# Patient Record
Sex: FEMALE | Race: WHITE
[De-identification: names, ages, dates, MRNs, and addresses within clinical notes are randomized per-mention and may not be internally consistent; named-entity substitution may affect disease eponyms.]

---

## 2017-09-20 ENCOUNTER — HOSPITAL ENCOUNTER (OUTPATIENT)
Dept: HOSPITAL 47 - RADMAMWWP | Age: 57
Discharge: HOME | End: 2017-09-20
Payer: MEDICAID

## 2017-09-20 DIAGNOSIS — Z12.31: Primary | ICD-10-CM

## 2017-09-20 PROCEDURE — 77063 BREAST TOMOSYNTHESIS BI: CPT

## 2017-09-21 NOTE — MM
Reason for exam: screening  (asymptomatic).

Last mammogram was performed 1 year ago.



History:

Patient is postmenopausal and is nulliparous.

Benign cyst aspiration of the right breast, August 2005.  2 cyst aspirations of 

the left breast.



Physical Findings:

A clinical breast exam by your physician is recommended on an annual basis and 

results should be correlated with mammographic findings.



MG 3D Screening Mammo W/Cad

Bilateral CC and MLO view(s) were taken.

Prior study comparison: September 19, 2016, bilateral MG 3d screening mammo w/cad.

September 15, 2015, bilateral MG screening mammo w CAD.

The breast tissue is heterogeneously dense. This may lower the sensitivity of 

mammography.

Finding: There is a 8 mm mass located 4.4 cm from the nipple in the upper inner 

quadrant, middle position of the right breast for which an ultrasound will be 

performed. No suspicious abnormality on the left breast.





ASSESSMENT: Incomplete: need additional imaging evaluation, BI-RAD 0



RECOMMENDATION:

Ultrasound of the right breast.



[facility] will attempt to contact patient  to return for ultrasound.

## 2017-09-22 ENCOUNTER — HOSPITAL ENCOUNTER (OUTPATIENT)
Dept: HOSPITAL 47 - RADUSWWP | Age: 57
Discharge: HOME | End: 2017-09-22
Payer: MEDICAID

## 2017-09-22 DIAGNOSIS — R92.8: Primary | ICD-10-CM

## 2017-09-22 NOTE — USB
Reason for exam: additional evaluation requested from abnormal screening.



History:

Patient is postmenopausal and is nulliparous.

Benign cyst aspiration of the right breast, August 2005.  2 cyst aspirations of 

the left breast.



Physical Findings:

Nurse did not find any significant physical abnormalities on exam.



US Breast Workup Limited RT

Right breast ultrasound demonstrates a 4 x 4 x 4mm oval, solid, hypoechoic lesion 

at 2 o'clock.



These results were verbally communicated with the patient and result sheet given 

to the patient on 9/22/17.





ASSESSMENT: Suspicious, BI-RAD 4



RECOMMENDATION:

Surgical consultation and ultrasound core biopsy of the right breast.



Called Dr. Ying with mammographic findings and has scheduled an appointment 

for the patient for 10/5/17 at 8:30 with Dr. Alonso.

Ultrasound core biopsy scheduled for 9/29/17 at 8:00.





PRELIMINARY REPORT CALLED AND FAXED TO DR. ALONSO ON 9/22/17.

## 2017-09-29 ENCOUNTER — HOSPITAL ENCOUNTER (OUTPATIENT)
Dept: HOSPITAL 47 - RADUSWWP | Age: 57
End: 2017-09-29
Payer: MEDICAID

## 2017-09-29 VITALS — SYSTOLIC BLOOD PRESSURE: 152 MMHG | DIASTOLIC BLOOD PRESSURE: 83 MMHG | HEART RATE: 79 BPM

## 2017-09-29 VITALS — RESPIRATION RATE: 16 BRPM | TEMPERATURE: 97.9 F

## 2017-09-29 VITALS — BODY MASS INDEX: 26.9 KG/M2

## 2017-09-29 DIAGNOSIS — N60.31: Primary | ICD-10-CM

## 2017-09-29 DIAGNOSIS — R92.8: ICD-10-CM

## 2017-09-29 PROCEDURE — 88305 TISSUE EXAM BY PATHOLOGIST: CPT

## 2017-09-29 PROCEDURE — 19083 BX BREAST 1ST LESION US IMAG: CPT

## 2017-09-29 NOTE — MM
Reason for exam: additional evaluation requested from abnormal screening.

Last mammogram was performed less than 1 month ago.



History:

Patient is postmenopausal and is nulliparous.

Benign cyst aspiration of the right breast, August 2005.  2 cyst aspirations of 

the left breast.



MG Diagnostic Mammo RT Wo CAD

CC and ML view(s) were taken of the right breast.

Prior study comparison: September 20, 2017, bilateral MG 3d screening mammo w/cad.

September 19, 2016, bilateral MG 3d screening mammo w/cad.



ASSESSMENT: Post procedure mammogram for marker placement



RECOMMENDATION:

Ultrasound of the right breast in 6 months.

PENDING PATHOLOGY RESULTS.

## 2017-09-29 NOTE — USB
EXAMINATION TYPE: US biopsy breast VAD RT

 

DATE OF EXAM: 9/29/2017

 

CLINICAL HISTORY: Abnormal Mammogram R92.8.

 

TECHNIQUE: Ultrasound guided core biopsy of right breast.  

 

COMPARISON: Targeted right breast ultrasound dated 9/22/2017 and mammogram 
dated 9/20/2017

 

FINDINGS: The procedure of ultrasound guided core biopsy was explained to the 
patient.  Benefits, alternatives, and risks were discussed.  An informed 
consent was then obtained.  

 

The patient was placed in supine positioning for  imaging and for the 
procedure. The overlying skin was prepped and draped in usual sterile fashion. 
8 cc of 1% lidocaine was used as anesthetic into the skin and subcutaneous 
tissue up to shadowing 4 mm solid hypoechoic mass at the 2:00 position in the 
right breast.

 

Under ultrasound guidance, a 12-gauge vacuum assisted biopsy gun device was 
used to obtain 6 core samples.  Following this, a biopsy clip was near, 
although the lesion was obscured after lidocaine displacement and postbiopsy 
change.  The clip appears appropriately placed in the postprocedure mammogram 
CC view and possibly 8-9 mm cranial on the MLO view. The patient tolerated the 
procedure well without any immediate complication.  The patient was kept in the 
radiology department for short stay after the procedure and then discharged 
home in stable condition.  

 

IMPRESSION: Successful, uncomplicated ultrasound guided core biopsy of a 4 mm 
solid hypoechoic mass at the 2:00 position within the right breast, full 
pathology results to follow. Note the mass became obscured due to postbiopsy 
change and the clip may be located 8 to 9 mm cranial to the mass. If pathology 
result is positive repeat ultrasound to evaluate clip placement or 3D right 
diagnostic mammogram to evaluate clip relationship to the mass is recommended 
before needle localization.

 

Pathology Results: Benign



BREAST, RIGHT, SITE A 2:00, CORE BIOPSY: BENIGN BREAST WITH FIBROCYSTIC CHANGES 
INCLUDING FIBROSIS AND AN ISOLATED DUCT WITH USUAL TYPE DUCTAL HYPERPLASIA. 
FRAGMENTS OF AMORPHOUS MATERIAL WITH DEGENERATED CELLULAR MATERIAL AND BLOOD. 
SEE NOTE. 



Recommendation

Repeat procedure.



(degree of architectural distortion and shadowing is concerning)
MTDD

## 2017-11-20 ENCOUNTER — HOSPITAL ENCOUNTER (OUTPATIENT)
Dept: HOSPITAL 47 - OR | Age: 57
Discharge: HOME | End: 2017-11-20
Payer: MEDICAID

## 2017-11-20 VITALS — HEART RATE: 73 BPM | DIASTOLIC BLOOD PRESSURE: 79 MMHG | SYSTOLIC BLOOD PRESSURE: 141 MMHG

## 2017-11-20 VITALS — RESPIRATION RATE: 18 BRPM

## 2017-11-20 VITALS — BODY MASS INDEX: 26.9 KG/M2

## 2017-11-20 VITALS — TEMPERATURE: 98.1 F

## 2017-11-20 DIAGNOSIS — D05.01: Primary | ICD-10-CM

## 2017-11-20 DIAGNOSIS — N60.31: ICD-10-CM

## 2017-11-20 DIAGNOSIS — N60.01: ICD-10-CM

## 2017-11-20 DIAGNOSIS — I10: ICD-10-CM

## 2017-11-20 DIAGNOSIS — M81.0: ICD-10-CM

## 2017-11-20 DIAGNOSIS — N60.21: ICD-10-CM

## 2017-11-20 DIAGNOSIS — N60.81: ICD-10-CM

## 2017-11-20 PROCEDURE — 19285 PERQ DEV BREAST 1ST US IMAG: CPT

## 2017-11-20 PROCEDURE — 19125 EXCISION BREAST LESION: CPT

## 2017-11-20 PROCEDURE — 88307 TISSUE EXAM BY PATHOLOGIST: CPT

## 2017-11-20 PROCEDURE — 76098 X-RAY EXAM SURGICAL SPECIMEN: CPT

## 2017-11-20 PROCEDURE — 88341 IMHCHEM/IMCYTCHM EA ADD ANTB: CPT

## 2017-11-20 PROCEDURE — 76642 ULTRASOUND BREAST LIMITED: CPT

## 2017-11-20 PROCEDURE — 88342 IMHCHEM/IMCYTCHM 1ST ANTB: CPT

## 2017-11-20 NOTE — P.OP
Date of Procedure: 11/20/17


Procedure(s) Performed: 


PREOPERATIVE DIAGNOSIS: Abnormal right mammogram and ultrasound


POSTOPERATIVE DIAGNOSIS: Same


PROCEDURE: Right Breast wire localization biopsy


SURGEON: Yareli


EBL: Minimal


ANESTHESIA: Sedation plus local


COMPLICATIONS: None


OPERATIVE PROCEDURE: Patient was placed on the operating room table in the 

supine position.  The patient's breast was prepped and draped in usual sterile 

fashion.  A curvilinear incision was made adjacent to the wire entrance site 

along the areolar border.  I followed the wire down into the breast tissue.  

The breast tissue around the tip of the wire was fully excised using 

electrocautery.  The specimen was sent for specimen radiogram.  The clip was 

present within the specimen.  The subcutaneous tissues were inspected.  No 

bleeding was seen.  The subcutaneous tissues were closed using 3-0 Vicryl 

sutures.  The skin was closed using a running 4-0 Monocryl stitch.  Steri-

Strips and sterile dressings were applied.


DISPOSITION: Stable to recovery room

## 2017-11-20 NOTE — USB
EXAMINATION TYPE: 

 

US breast localization RT, 

Post wire placement 3D diag mammo wo cad RT, 

MG surgical specimen RT

 

DATE OF EXAM: 11/20/2017

 

CLINICAL HISTORY: 57-year-old female status post ultrasound-guided 2:00 right 
breast biopsy. Benign results but with suspicious imaging findings. Canceled 
second attempt ultrasound-guided biopsy due to difficulty visualizing lesion. 
Referred for wire localization and excision.

 

TECHNIQUE: Needle localization with wire placement and surgical excision of 
area of concern in the right breast 2:00 position

 

COMPARISON: 11/20/2017, 9/29/2017, 9/20/2017, 9/19/2016

 

FINDINGS: 

The procedure of needle localization with wire placement and than surgical 
excision was explained to the patient.  Benefits, alternatives, and risks were 
discussed.  An informed consent was then obtained.  

 

Ultrasound was used to localize the 2:00 previously biopsied area which appears 
as a vague area of irregular hypoechogenicity.

 

The shortest pathway for procedure was chosen.  Shortest pathway was a medial 
approach. The overlying skin was prepped and draped in usual sterile fashion.  
Lidocaine was used as anesthetic into the skin and subcutaneous tissue up to 
the level of area of concern.  

 

A 7 cm Kopans needle was used.  It was placed via a medial approach under 
ultrasound guidance.  Once the needle was through and beyond the lesion, the 
needle was withdrawn leaving the wire in place. From the tip to the mid of the 
lesion, there is a distance of 1.8 cm. From the tip of the needle to the skin 
surface, there is a distance of approximately 5 cm.

 

The wire was fixed to patient's skin.  Images were marked for surgeon.  The 
patient tolerated the procedure well without any immediate complication.  The 
patient was kept in the radiology department for short stay after the procedure 
and then taken to surgery for surgical excision.    

 

Post localization mammogram shows the thick segment of the needle along the 
biopsy clip.

 

The microclip and wire are identified in specimen mammogram.  The patient was 
kept in hospital for short stay after the procedure and then discharged home in 
stable condition.

 

 

IMPRESSION: 

Successful, uncomplicated ultrasound-guided needle localization with wire 
placement and surgical excision of recently biopsied (benign results) but still 
suspicious site in the 2:00 left breast. Excision being performed due to 
concern for discordant results. Full pathology results to follow.  

 

Pathology Results: Malignant



BREAST, EXCISIONAL BIOPSY: LOBULAR CARCINOMA IN SITU (LCIS).  FIBROCYSTIC 
CHANGE (STROMAL FIBROSIS, CYST FORMATION, APOCRINE METAPLASIA, ADENOSIS, 
FIBROADENOMATOUS HYPERPLASIA AND DUCT HYPERPLASIA).  BIOPSY SITE CHANGE AND 
SCAR. 



Recommendation

Surgical consult of the right breast.
MTDD

## 2017-11-20 NOTE — USB
Reason for exam: additional evaluation requested from prior study.



History:

Patient is postmenopausal and is nulliparous.

US discontinued breast bx RT of the right breast, November 6, 2017.  Benign US 

biopsy breast VAD RT of the right breast, September 29, 2017.  Benign cyst 

aspiration of the right breast, August 2005.  2 cyst aspirations of the left 

breast.



US Breast Limited RT

Right breast ultrasound demonstrates a 9 x 4 x 7mm irregular, hypoechoic, vague 

lesion at 2 o'clock at the site of previous biopsy. This can be targeted for 

ultrasound guided wire loc.



These results were verbally communicated with the patient and result sheet given 

to the patient on 11/20/17.





ASSESSMENT: Suspicious, BI-RAD 4



RECOMMENDATION:

Localization and excision of the right breast.

## 2017-11-20 NOTE — P.GSHP
History of Present Illness


H&P Date: 11/20/17


Chief Complaint: Abnormal right breast ultrasound





Patient underwent a recent mammogram showing an 8 mm lesion in the right breast 

upper inner quadrant.  She then had an ultrasound showing a 4 mm solid lesion 

at that location.  Ultrasound core biopsy showed benign findings but there is 

concern of discordance.  For that reason radiology has advised a wire 

localization biopsy for confirmation of benign nature of this lesion.  Patient 

has no family history of breast cancer.  Prior to this mammogram she had no 

complaints.  No prior biopsies.





Past Medical History


Past Medical History: Hypertension


Additional Past Medical History / Comment(s): osteoporosis.  HAS MASS TO RT 

BREAST


History of Any Multi-Drug Resistant Organisms: None Reported


Past Surgical History: Breast Surgery


Additional Past Surgical History / Comment(s): ovarian cyst removal, 

Oophorectomy left,  Breast biopsy x 3 benign.  COLONOSCOPY


Past Anesthesia/Blood Transfusion Reactions: No Reported Reaction


Smoking Status: Never smoker





- Past Family History


  ** Mother


Family Medical History: No Reported History





Medications and Allergies


 Home Medications











 Medication  Instructions  Recorded  Confirmed  Type


 


Alendronate Sodium [Fosamax] 1 each PO BERUMEN 09/25/17 11/15/17 History


 


Calcium Carbonate/Vitamin D3 1 each PO DAILY 09/25/17 11/15/17 History





[Caltrate 600 Plus D3 Tablet]    


 


Multivit-Min/FA/Lycopen/Lutein 1 each PO DAILY 09/25/17 11/15/17 History





[Centrum Silver Tablet]    


 


Ramipril [Altace] 5 mg PO DAILY 09/25/17 11/15/17 History











 Allergies











Allergy/AdvReac Type Severity Reaction Status Date / Time


 


No Known Allergies Allergy   Verified 11/15/17 11:46














Surgical - Exam








Physical exam:


General: Well-developed, well-nourished


HEENT: Normocephalic, sclerae nonicteric


Right breast with recent scar and some bruising, mild tenderness, no adenopathy 

left breast without masses or adenopathy


Abdomen: Nontender, nondistended


Extremities: No edema


Neuro: Alert and oriented





Assessment and Plan


(1) Abnormal ultrasound of breast


Narrative/Plan: 


Will proceed with wire localization biopsy today for confirmation sake.  Risks 

of bleeding, infection, scarring, numbness, pain, and potential need for 

additional surgery were discussed.  She understands and wishes to proceed.


Status: Acute   Code(s): R92.8 - OTH ABN AND INCONCLUSIVE FINDINGS ON DX 

IMAGING OF BREAST   SNOMED Code(s): [id number]

## 2017-12-30 ENCOUNTER — HOSPITAL ENCOUNTER (OUTPATIENT)
Dept: HOSPITAL 47 - LABWHC1 | Age: 57
Discharge: HOME | End: 2017-12-30
Payer: MEDICAID

## 2017-12-30 DIAGNOSIS — D05.01: ICD-10-CM

## 2017-12-30 DIAGNOSIS — R00.1: Primary | ICD-10-CM

## 2017-12-30 DIAGNOSIS — M81.0: ICD-10-CM

## 2017-12-30 DIAGNOSIS — I10: ICD-10-CM

## 2017-12-30 LAB
ALBUMIN SERPL-MCNC: 4.8 G/DL (ref 3.5–5)
ALP SERPL-CCNC: 47 U/L (ref 38–126)
ALT SERPL-CCNC: 39 U/L (ref 9–52)
ANION GAP SERPL CALC-SCNC: 10 MMOL/L
AST SERPL-CCNC: 20 U/L (ref 14–36)
BASOPHILS # BLD AUTO: 0.1 K/UL (ref 0–0.2)
BASOPHILS NFR BLD AUTO: 1 %
BUN SERPL-SCNC: 17 MG/DL (ref 7–17)
CALCIUM SPEC-MCNC: 10.6 MG/DL (ref 8.4–10.2)
CHLORIDE SERPL-SCNC: 100 MMOL/L (ref 98–107)
CHOLEST SERPL-MCNC: 195 MG/DL (ref ?–200)
CK SERPL-CCNC: 34 U/L (ref 30–135)
CO2 SERPL-SCNC: 28 MMOL/L (ref 22–30)
EOSINOPHIL # BLD AUTO: 0.1 K/UL (ref 0–0.7)
EOSINOPHIL NFR BLD AUTO: 1 %
ERYTHROCYTE [DISTWIDTH] IN BLOOD BY AUTOMATED COUNT: 5.84 M/UL (ref 3.8–5.4)
ERYTHROCYTE [DISTWIDTH] IN BLOOD: 14.1 % (ref 11.5–15.5)
GLUCOSE SERPL-MCNC: 82 MG/DL (ref 74–99)
HCT VFR BLD AUTO: 51.5 % (ref 34–46)
HDLC SERPL-MCNC: 58 MG/DL (ref 40–60)
HGB BLD-MCNC: 16 GM/DL (ref 11.4–16)
KETONES UR QL STRIP.AUTO: (no result)
LDLC SERPL CALC-MCNC: 123 MG/DL (ref 0–99)
LYMPHOCYTES # SPEC AUTO: 1.8 K/UL (ref 1–4.8)
LYMPHOCYTES NFR SPEC AUTO: 25 %
MAGNESIUM SPEC-SCNC: 2 MG/DL (ref 1.6–2.3)
MCH RBC QN AUTO: 27.4 PG (ref 25–35)
MCHC RBC AUTO-ENTMCNC: 31.1 G/DL (ref 31–37)
MCV RBC AUTO: 88.3 FL (ref 80–100)
MONOCYTES # BLD AUTO: 0.4 K/UL (ref 0–1)
MONOCYTES NFR BLD AUTO: 6 %
NEUTROPHILS # BLD AUTO: 4.6 K/UL (ref 1.3–7.7)
NEUTROPHILS NFR BLD AUTO: 65 %
PH UR: 6 [PH] (ref 5–8)
PLATELET # BLD AUTO: 258 K/UL (ref 150–450)
POTASSIUM SERPL-SCNC: 4.6 MMOL/L (ref 3.5–5.1)
PROT SERPL-MCNC: 7.4 G/DL (ref 6.3–8.2)
RBC UR QL: <1 /HPF (ref 0–5)
SODIUM SERPL-SCNC: 138 MMOL/L (ref 137–145)
SP GR UR: 1.01 (ref 1–1.03)
SQUAMOUS UR QL AUTO: <1 /HPF (ref 0–4)
T4 FREE SERPL-MCNC: 1.23 NG/DL (ref 0.78–2.19)
TRIGL SERPL-MCNC: 68 MG/DL (ref ?–150)
URATE SERPL-MCNC: 4.9 MG/DL (ref 3.7–7.4)
UROBILINOGEN UR QL STRIP: <2 MG/DL (ref ?–2)
WBC # BLD AUTO: 7 K/UL (ref 3.8–10.6)
WBC #/AREA URNS HPF: 4 /HPF (ref 0–5)

## 2017-12-30 PROCEDURE — 82306 VITAMIN D 25 HYDROXY: CPT

## 2017-12-30 PROCEDURE — 86300 IMMUNOASSAY TUMOR CA 15-3: CPT

## 2017-12-30 PROCEDURE — 82550 ASSAY OF CK (CPK): CPT

## 2017-12-30 PROCEDURE — 80061 LIPID PANEL: CPT

## 2017-12-30 PROCEDURE — 85025 COMPLETE CBC W/AUTO DIFF WBC: CPT

## 2017-12-30 PROCEDURE — 84439 ASSAY OF FREE THYROXINE: CPT

## 2017-12-30 PROCEDURE — 83735 ASSAY OF MAGNESIUM: CPT

## 2017-12-30 PROCEDURE — 36415 COLL VENOUS BLD VENIPUNCTURE: CPT

## 2017-12-30 PROCEDURE — 93005 ELECTROCARDIOGRAM TRACING: CPT

## 2017-12-30 PROCEDURE — 80053 COMPREHEN METABOLIC PANEL: CPT

## 2017-12-30 PROCEDURE — 81001 URINALYSIS AUTO W/SCOPE: CPT

## 2017-12-30 PROCEDURE — 84443 ASSAY THYROID STIM HORMONE: CPT

## 2017-12-30 PROCEDURE — 84550 ASSAY OF BLOOD/URIC ACID: CPT

## 2017-12-30 PROCEDURE — 82378 CARCINOEMBRYONIC ANTIGEN: CPT

## 2018-01-18 ENCOUNTER — HOSPITAL ENCOUNTER (OUTPATIENT)
Dept: HOSPITAL 47 - RADBDWWP | Age: 58
Discharge: HOME | End: 2018-01-18
Payer: MEDICAID

## 2018-01-18 DIAGNOSIS — M85.80: Primary | ICD-10-CM

## 2018-01-18 DIAGNOSIS — M81.0: ICD-10-CM

## 2018-01-18 PROCEDURE — 77080 DXA BONE DENSITY AXIAL: CPT

## 2018-01-19 NOTE — BD
EXAMINATION TYPE: MG DEXA axial skeleton.  

 

DATE OF EXAM: 1/18/2018

 

COMPARISON: NONE

 

CLINICAL HISTORY: 57-year-old female age-related osteoporosis, postmenopausal screening

 

Height:  4 FT 11 1/2 IN

Weight:  136

 

FRAX RISK QUESTIONS:

Alcohol (3 or more units per day):  NO

Family History (Parent hip fracture):  NO

Glucocorticoids (More than 3mos):  NO

           (Ex: prednisone, prednisolone, methylprednisolone, dexamethasone, and hydrocortisone).    
     

History of Fracture in Adulthood: YES

Secondary Osteoporosis:

  1.  Type 1 Diabetes: NO

  2.  Hyperthyroidism: NO

  3.  Menopause before 45: NO

  4.  Malnutrition: NO

  5.  Chronic liver disease: NO

Rheumatoid Arthritis: NO

Current Tobacco Use: NO

 

RISK FACTORS 

HISTORY OF: 

History of Wrist Fracture: RT 

When: AGE 36

Family History of Osteoporosis: YES

Active: YES

Postmenopausal woman: AGE 52

 

MEDICATIONS: 

Prednisone or other steroids:

How Long:

Osteoporosis Medications: YES

Which medication:  FOSAMZX

How Long: OVER FIVE YEARS

Additional Medications: TAMOXIFEN, FOSAMAX, VITAMINS

 

 

Additional History:

 

 

EXAM MEASUREMENTS: 

Bone mineral densitometry was performed using the [employer] System.

Bone mineral density as measured about the Lumbar spine is:  

----- L1-L4(G/cm2): 0.951

T Score Values are as follows:

----- L2: -2.5

----- L3: -1.6

----- L4: -1.4

----- L1-L4: -1.9

Bone mineral density has: INCREASED  3.8 % since study of: 2015

 

Bone mineral density about the R hip (g/cm2): 0.730

Bone mineral density about the L hip (g/cm2): 0.722

T Score values are as follows:

-----R Neck: -2.2

-----L Neck: -2.3

-----R Total: -1.4

-----L Total: -1.5

Bone mineral density has: INCREASED 2.0 % since study of: 2015

 

 

IMPRESSION:

Osteopenia (T Score between -2.5 and -1 as noted by T score values in the lumbar spine and both hips)
.

 

Note that measurements are approaching osteoporosis in the upper lumbar spine.

 

There is slightly increased risk of fracture and the patient may be considered for treatment. Re-Scre
en 2-5 years.

 

 

 

 

 

NOTE:  T-SCORE=SD OF THE YOUNG ADULT MEAN.

## 2018-04-02 ENCOUNTER — HOSPITAL ENCOUNTER (OUTPATIENT)
Dept: HOSPITAL 47 - RADMAMWWP | Age: 58
Discharge: HOME | End: 2018-04-02
Payer: MEDICAID

## 2018-04-02 DIAGNOSIS — R92.8: Primary | ICD-10-CM

## 2018-04-02 PROCEDURE — 77065 DX MAMMO INCL CAD UNI: CPT

## 2018-04-02 NOTE — MM
Reason for exam: follow-up at short interval from prior study.

Last mammogram was performed 4 months ago.



History:

Patient is postmenopausal, has history of breast cancer at age 57, and is 

nulliparous.

Malignant US breast localization RT of the right breast, November 20, 2017.  US 

discontinued breast bx RT of the right breast, November 6, 2017.  Benign US biopsy

breast VAD RT of the right breast, September 29, 2017.  Benign cyst aspiration of

the right breast, August 2005.  2 cyst aspirations of the left breast.



Physical Findings:

Nurse did not find any significant physical abnormalities on exam.



MG 3D Diag Mammo W/Cad RT

CC and MLO view(s) were taken of the right breast.

Prior study comparison: November 20, 2017, right breast MG 3d diag mammo wo cad 

RT.  September 29, 2017, right breast MG diagnostic mammo RT wo CAD.  September 19, 2016, bilateral MG 3d screening mammo w/cad.  September 15, 2015, bilateral MG

screening mammo w CAD.

The breast tissue is heterogeneously dense. This may lower the sensitivity of 

mammography.  2 o'clock architectural distortion at a middle depth suggests 

lumpectomy scar. 6 month follow up recommended to assess for evolving post 

surgical changes. Excision proven LCIS.



These results were verbally communicated with the patient and result sheet given 

to the patient on 4/2/18.





ASSESSMENT: Probably benign, BI-RAD 3



RECOMMENDATION:

Follow-up diagnostic mammogram of both breasts in 6 months.

## 2018-10-03 ENCOUNTER — HOSPITAL ENCOUNTER (OUTPATIENT)
Dept: HOSPITAL 47 - RADMAMWWP | Age: 58
Discharge: HOME | End: 2018-10-03
Attending: SURGERY
Payer: MEDICAID

## 2018-10-03 DIAGNOSIS — R92.8: Primary | ICD-10-CM

## 2018-10-03 PROCEDURE — 77066 DX MAMMO INCL CAD BI: CPT

## 2018-10-03 PROCEDURE — 77062 BREAST TOMOSYNTHESIS BI: CPT

## 2018-10-03 NOTE — MM
Reason for exam: follow-up at short interval from prior study.

Last mammogram was performed 6 months ago.



History:

Patient is postmenopausal, has history of breast cancer at age 57, and is 

nulliparous.

Malignant US breast localization RT of the right breast, November 20, 2017.  US 

discontinued breast bx RT of the right breast, November 6, 2017.  Benign US biopsy

breast VAD RT of the right breast, September 29, 2017.  Benign cyst aspiration of

the right breast, August 2005.  2 cyst aspirations of the left breast.



Physical Findings:

Nurse did not find any significant physical abnormalities on exam.



MG 3D Diag Mammo W/Cad GINETTE

Bilateral CC and MLO view(s) were taken.

Prior study comparison: April 2, 2018, right breast MG 3d diag mammo w/cad RT.  

November 20, 2017, right breast MG 3d diag mammo wo cad RT.

The breast tissue is heterogeneously dense. This may lower the sensitivity of 

mammography.  Redemonstrated large area of medial distortion right breast, stable 

for 6 months, likely post lumpectomy scar at site of LCIS. Additional 6 month 

follow up recommended.



These results were verbally communicated with the patient and result sheet given 

to the patient on 10/3/18.





ASSESSMENT: Probably benign, BI-RAD 3



RECOMMENDATION:

Follow-up diagnostic mammogram of the right breast in 6 months.

## 2019-02-25 ENCOUNTER — HOSPITAL ENCOUNTER (OUTPATIENT)
Dept: HOSPITAL 47 - LABWHC1 | Age: 59
Discharge: HOME | End: 2019-02-25
Attending: INTERNAL MEDICINE
Payer: MEDICAID

## 2019-02-25 DIAGNOSIS — R00.1: Primary | ICD-10-CM

## 2019-02-25 PROCEDURE — 36415 COLL VENOUS BLD VENIPUNCTURE: CPT

## 2019-02-25 PROCEDURE — 93005 ELECTROCARDIOGRAM TRACING: CPT

## 2019-03-02 ENCOUNTER — HOSPITAL ENCOUNTER (OUTPATIENT)
Dept: HOSPITAL 47 - LABWHC1 | Age: 59
Discharge: HOME | End: 2019-03-02
Attending: INTERNAL MEDICINE
Payer: MEDICAID

## 2019-03-02 DIAGNOSIS — R00.1: ICD-10-CM

## 2019-03-02 DIAGNOSIS — M81.0: ICD-10-CM

## 2019-03-02 DIAGNOSIS — Z00.00: Primary | ICD-10-CM

## 2019-03-02 DIAGNOSIS — I10: ICD-10-CM

## 2019-03-02 DIAGNOSIS — D05.01: ICD-10-CM

## 2019-03-02 LAB
ALBUMIN SERPL-MCNC: 4.4 G/DL (ref 3.8–4.9)
ALBUMIN/GLOB SERPL: 2.32 G/DL (ref 1.6–3.17)
ALP SERPL-CCNC: 30 U/L (ref 41–126)
ALT SERPL-CCNC: 17 U/L (ref 8–44)
ANION GAP SERPL CALC-SCNC: 7.1 MMOL/L (ref 4–12)
AST SERPL-CCNC: 23 U/L (ref 13–35)
BASOPHILS # BLD AUTO: 0 K/UL (ref 0–0.2)
BASOPHILS NFR BLD AUTO: 1 %
BUN SERPL-SCNC: 14 MG/DL (ref 9–27)
CALCIUM SPEC-MCNC: 9.6 MG/DL (ref 8.7–10.3)
CHLORIDE SERPL-SCNC: 102 MMOL/L (ref 96–109)
CHOLEST SERPL-MCNC: 150 MG/DL (ref 0–200)
CK SERPL-CCNC: 39 U/L (ref 26–186)
CO2 SERPL-SCNC: 25.9 MMOL/L (ref 21.6–31.8)
EOSINOPHIL # BLD AUTO: 0.1 K/UL (ref 0–0.7)
EOSINOPHIL NFR BLD AUTO: 1 %
ERYTHROCYTE [DISTWIDTH] IN BLOOD BY AUTOMATED COUNT: 5.1 M/UL (ref 3.8–5.4)
ERYTHROCYTE [DISTWIDTH] IN BLOOD: 13.1 % (ref 11.5–15.5)
GLOBULIN SER CALC-MCNC: 1.9 G/DL (ref 1.6–3.3)
GLUCOSE SERPL-MCNC: 81 MG/DL (ref 70–110)
HBA1C MFR BLD: 5.6 % (ref 4–6)
HCT VFR BLD AUTO: 43.8 % (ref 34–46)
HDLC SERPL-MCNC: 48 MG/DL (ref 40–60)
HGB BLD-MCNC: 14.2 GM/DL (ref 11.4–16)
KETONES UR QL STRIP.AUTO: (no result)
LDLC SERPL CALC-MCNC: 92 MG/DL (ref 0–131)
LYMPHOCYTES # SPEC AUTO: 1.8 K/UL (ref 1–4.8)
LYMPHOCYTES NFR SPEC AUTO: 25 %
MAGNESIUM SPEC-SCNC: 1.9 MG/DL (ref 1.5–2.4)
MCH RBC QN AUTO: 27.8 PG (ref 25–35)
MCHC RBC AUTO-ENTMCNC: 32.4 G/DL (ref 31–37)
MCV RBC AUTO: 85.9 FL (ref 80–100)
MONOCYTES # BLD AUTO: 0.4 K/UL (ref 0–1)
MONOCYTES NFR BLD AUTO: 6 %
NEUTROPHILS # BLD AUTO: 4.5 K/UL (ref 1.3–7.7)
NEUTROPHILS NFR BLD AUTO: 65 %
PH UR: 5.5 [PH] (ref 5–8)
PLATELET # BLD AUTO: 240 K/UL (ref 150–450)
POTASSIUM SERPL-SCNC: 4.4 MMOL/L (ref 3.5–5.5)
PROT SERPL-MCNC: 6.3 G/DL (ref 6.2–8.2)
SODIUM SERPL-SCNC: 135 MMOL/L (ref 135–145)
SP GR UR: 1 (ref 1–1.03)
T4 FREE SERPL-MCNC: 1.1 NG/DL (ref 0.8–1.8)
TRIGL SERPL-MCNC: <50 MG/DL (ref 0–149)
URATE SERPL-MCNC: 4.7 MG/DL (ref 2.9–7.7)
UROBILINOGEN UR QL STRIP: <2 MG/DL (ref ?–2)
VLDLC SERPL CALC-MCNC: 9.98 MG/DL (ref 5–40)
WBC # BLD AUTO: 6.9 K/UL (ref 3.8–10.6)

## 2019-03-02 PROCEDURE — 85025 COMPLETE CBC W/AUTO DIFF WBC: CPT

## 2019-03-02 PROCEDURE — 83036 HEMOGLOBIN GLYCOSYLATED A1C: CPT

## 2019-03-02 PROCEDURE — 81003 URINALYSIS AUTO W/O SCOPE: CPT

## 2019-03-02 PROCEDURE — 80061 LIPID PANEL: CPT

## 2019-03-02 PROCEDURE — 84550 ASSAY OF BLOOD/URIC ACID: CPT

## 2019-03-02 PROCEDURE — 36415 COLL VENOUS BLD VENIPUNCTURE: CPT

## 2019-03-02 PROCEDURE — 84439 ASSAY OF FREE THYROXINE: CPT

## 2019-03-02 PROCEDURE — 82550 ASSAY OF CK (CPK): CPT

## 2019-03-02 PROCEDURE — 83735 ASSAY OF MAGNESIUM: CPT

## 2019-03-02 PROCEDURE — 80053 COMPREHEN METABOLIC PANEL: CPT

## 2019-03-02 PROCEDURE — 84443 ASSAY THYROID STIM HORMONE: CPT

## 2019-03-02 PROCEDURE — 82306 VITAMIN D 25 HYDROXY: CPT

## 2019-04-08 ENCOUNTER — HOSPITAL ENCOUNTER (OUTPATIENT)
Dept: HOSPITAL 47 - RADMAMWWP | Age: 59
Discharge: HOME | End: 2019-04-08
Attending: SURGERY
Payer: MEDICAID

## 2019-04-08 DIAGNOSIS — R92.8: Primary | ICD-10-CM

## 2019-04-08 PROCEDURE — 77065 DX MAMMO INCL CAD UNI: CPT

## 2019-04-08 PROCEDURE — 77061 BREAST TOMOSYNTHESIS UNI: CPT

## 2019-04-08 NOTE — MM
Reason for exam: follow-up at short interval from prior study.

Last mammogram was performed 6 months ago.



History:

Patient is postmenopausal, has history of breast cancer at age 57, and is 

nulliparous.

Malignant US breast localization RT of the right breast, November 20, 2017.  US 

discontinued breast bx RT of the right breast, November 6, 2017.  Benign US biopsy

breast VAD RT of the right breast, September 29, 2017.  Benign cyst aspiration of

the right breast, August 2005.  2 cyst aspirations of the left breast.

Taking antineoplastic beginning at age 57.



Physical Findings:

Nurse did not find any significant physical abnormalities on exam.



MG 3D Diag Mammo W/Cad RT

CC and MLO view(s) were taken of the right breast.

Prior study comparison: April 2, 2018, right breast MG 3d diag mammo w/cad RT.  

September 20, 2017, bilateral MG 3d screening mammo w/cad.

The breast tissue is heterogeneously dense. This may lower the sensitivity of 

mammography.  There is no discrete abnormality.

No significant new findings when compared with previous films.



These results were verbally communicated with the patient and result sheet given 

to the patient on 4/8/19.





ASSESSMENT: Benign, BI-RAD 2



RECOMMENDATION:

Follow-up diagnostic mammogram of both breasts in 6 months.

Back on schedule.

## 2019-10-09 ENCOUNTER — HOSPITAL ENCOUNTER (OUTPATIENT)
Dept: HOSPITAL 47 - RADMAMWWP | Age: 59
Discharge: HOME | End: 2019-10-09
Attending: SURGERY
Payer: MEDICAID

## 2019-10-09 DIAGNOSIS — R92.8: Primary | ICD-10-CM

## 2019-10-09 PROCEDURE — 77066 DX MAMMO INCL CAD BI: CPT

## 2019-10-09 PROCEDURE — 77062 BREAST TOMOSYNTHESIS BI: CPT

## 2019-10-09 NOTE — MM
Reason for exam: follow-up at short interval from prior study.

Last mammogram was performed 6 months ago.



History:

Patient is postmenopausal, has history of breast cancer at age 57, and is 

nulliparous.

Malignant US breast localization RT of the right breast, November 20, 2017.  US 

discontinued breast bx RT of the right breast, November 6, 2017.  Benign US biopsy

breast VAD RT of the right breast, September 29, 2017.  Benign cyst aspiration of

the right breast, August 2005.  2 cyst aspirations of the left breast.

Taking antineoplastic beginning at age 57.



Physical Findings:

Nurse did not find any significant physical abnormalities on exam.



MG 3D Diag Mammo W/Cad GINETTE

Bilateral CC and MLO view(s) were taken.

Prior study comparison: April 8, 2019, right breast MG 3d diag mammo w/cad RT.  

October 3, 2018, bilateral MG 3d diag mammo w/cad GINETTE.

The breast tissue is heterogeneously dense. This may lower the sensitivity of 

mammography.  Evolving post surgical change right central inner breast at middle 

depth.



These results were verbally communicated with the patient and result sheet given 

to the patient on 10/9/19.





ASSESSMENT: Benign, BI-RAD 2



RECOMMENDATION:

Follow-up diagnostic mammogram of both breasts in 1 year.

## 2020-02-29 ENCOUNTER — HOSPITAL ENCOUNTER (OUTPATIENT)
Dept: HOSPITAL 47 - LABWHC1 | Age: 60
Discharge: HOME | End: 2020-02-29
Attending: INTERNAL MEDICINE
Payer: MEDICAID

## 2020-02-29 DIAGNOSIS — R00.1: ICD-10-CM

## 2020-02-29 DIAGNOSIS — Z00.00: Primary | ICD-10-CM

## 2020-02-29 LAB
ALBUMIN SERPL-MCNC: 4.5 G/DL (ref 3.8–4.9)
ALBUMIN/GLOB SERPL: 2.25 G/DL (ref 1.6–3.17)
ALP SERPL-CCNC: 31 U/L (ref 41–126)
ALT SERPL-CCNC: 19 U/L (ref 8–44)
ANION GAP SERPL CALC-SCNC: 8.3 MMOL/L (ref 4–12)
AST SERPL-CCNC: 21 U/L (ref 13–35)
BASOPHILS # BLD AUTO: 0 K/UL (ref 0–0.2)
BASOPHILS NFR BLD AUTO: 1 %
BUN SERPL-SCNC: 13 MG/DL (ref 9–27)
BUN/CREAT SERPL: 16.25 RATIO (ref 12–20)
CALCIUM SPEC-MCNC: 9.8 MG/DL (ref 8.7–10.3)
CHLORIDE SERPL-SCNC: 104 MMOL/L (ref 96–109)
CHOLEST SERPL-MCNC: 167 MG/DL (ref 0–200)
CO2 SERPL-SCNC: 25.7 MMOL/L (ref 21.6–31.8)
EOSINOPHIL # BLD AUTO: 0.1 K/UL (ref 0–0.7)
EOSINOPHIL NFR BLD AUTO: 1 %
ERYTHROCYTE [DISTWIDTH] IN BLOOD BY AUTOMATED COUNT: 5.01 M/UL (ref 3.8–5.4)
ERYTHROCYTE [DISTWIDTH] IN BLOOD: 12.5 % (ref 11.5–15.5)
GLOBULIN SER CALC-MCNC: 2 G/DL (ref 1.6–3.3)
GLUCOSE SERPL-MCNC: 87 MG/DL (ref 70–110)
HCT VFR BLD AUTO: 42.8 % (ref 34–46)
HDLC SERPL-MCNC: 51 MG/DL (ref 40–60)
HGB BLD-MCNC: 14.3 GM/DL (ref 11.4–16)
LDLC SERPL CALC-MCNC: 102.4 MG/DL (ref 0–131)
LYMPHOCYTES # SPEC AUTO: 1.9 K/UL (ref 1–4.8)
LYMPHOCYTES NFR SPEC AUTO: 29 %
MCH RBC QN AUTO: 28.5 PG (ref 25–35)
MCHC RBC AUTO-ENTMCNC: 33.4 G/DL (ref 31–37)
MCV RBC AUTO: 85.3 FL (ref 80–100)
MONOCYTES # BLD AUTO: 0.4 K/UL (ref 0–1)
MONOCYTES NFR BLD AUTO: 5 %
NEUTROPHILS # BLD AUTO: 4 K/UL (ref 1.3–7.7)
NEUTROPHILS NFR BLD AUTO: 60 %
PLATELET # BLD AUTO: 254 K/UL (ref 150–450)
POTASSIUM SERPL-SCNC: 4.1 MMOL/L (ref 3.5–5.5)
PROT SERPL-MCNC: 6.5 G/DL (ref 6.2–8.2)
SODIUM SERPL-SCNC: 138 MMOL/L (ref 135–145)
TRIGL SERPL-MCNC: 68 MG/DL (ref 0–149)
VLDLC SERPL CALC-MCNC: 13.6 MG/DL (ref 5–40)
WBC # BLD AUTO: 6.6 K/UL (ref 3.8–10.6)

## 2020-02-29 PROCEDURE — 84443 ASSAY THYROID STIM HORMONE: CPT

## 2020-02-29 PROCEDURE — 85025 COMPLETE CBC W/AUTO DIFF WBC: CPT

## 2020-02-29 PROCEDURE — 80053 COMPREHEN METABOLIC PANEL: CPT

## 2020-02-29 PROCEDURE — 93005 ELECTROCARDIOGRAM TRACING: CPT

## 2020-02-29 PROCEDURE — 36415 COLL VENOUS BLD VENIPUNCTURE: CPT

## 2020-02-29 PROCEDURE — 80061 LIPID PANEL: CPT

## 2020-03-05 ENCOUNTER — HOSPITAL ENCOUNTER (OUTPATIENT)
Dept: HOSPITAL 47 - RADBDWWP | Age: 60
Discharge: HOME | End: 2020-03-05
Attending: INTERNAL MEDICINE
Payer: MEDICAID

## 2020-03-05 DIAGNOSIS — M85.80: Primary | ICD-10-CM

## 2020-03-05 PROCEDURE — 77080 DXA BONE DENSITY AXIAL: CPT

## 2020-03-06 NOTE — BD
EXAMINATION TYPE: Axial Bone Density

 

DATE OF EXAM: 3/5/2020

 

COMPARISON: 01.18.2018

 

CLINICAL HISTORY: 59 YR OLD FEMALE.....ICD-10 CODE:  M81.0  OSTEOPOROSIS 

 

 

Height: 59.4

Weight:  138

 

FRAX RISK QUESTIONS:

History of Fracture in Adulthood: YES

  

RISK FACTORS 

HISTORY OF: 

History of Wrist Fracture: RT WRIST, >50 YRS OLD

Family History of Osteoporosis: YES, HER MOTHER WITHOUT HIP FX

Active: YES

Postmenopausal woman: YES, AT ABOUT [age]

Hyperparathyroidism: NO

Adrenal Insufficiency: NO

 

MEDICATIONS: 

Osteoporosis Medications: YES, FOSAMAX, FOR ABOUT 10+ YRS

Additional Medications: TOMOXIN, BP MEDS, CALCIUM WITH D 

Additional History: LCIS  BREAST, HYPERTENSION,  

 

EXAM MEASUREMENTS: 

Bone mineral densitometry was performed using the [employer] System.

Bone mineral density as measured about the Lumbar spine is:  

----- L1-L4(G/cm2): 1.012

T Score Values are as follows:

----- L1:  -1.4

----- L2:  -1.7

----- L3:  -1.5

----- L4:  -1.2

----- L1-L4:  -1.4

Bone mineral density has: Increased 4.4% since study of: 01.18.2018

 

Bone mineral density about the R hip (g/cm2): 0.844

Bone mineral density about the L hip (g/cm2):  0.822

T Score values are as follows:

-----R Neck: -1.9

-----L Neck:  -2.0

-----R Total:  -1.3

-----L Total:  -1.5

Bone mineral density has: Increased 1.1% since study of: 01.18.2018

 

FRAX%s:    THERE S A 16.6% CHANCE FOR A MAJOR OSTEOPOROTIC FX AND A 2.3% FOR HIP.....PROBABILITY FOR 
FX IN 10 YRS TIME

 

IMPRESSION:

Osteopenia (T Score between -2.5 and -1).

 

There is slightly increased risk of fracture and the patient may be considered 

for treatment. 

 

Re-Screen 2-5 years.

 

 

 

 

 

NOTE:  T-SCORE=SD OF THE YOUNG ADULT MEAN.

## 2020-10-12 ENCOUNTER — HOSPITAL ENCOUNTER (OUTPATIENT)
Dept: HOSPITAL 47 - RADMAMWWP | Age: 60
Discharge: HOME | End: 2020-10-12
Attending: INTERNAL MEDICINE
Payer: MEDICAID

## 2020-10-12 DIAGNOSIS — Z08: Primary | ICD-10-CM

## 2020-10-12 DIAGNOSIS — Z85.3: ICD-10-CM

## 2020-10-12 PROCEDURE — 77062 BREAST TOMOSYNTHESIS BI: CPT

## 2020-10-12 PROCEDURE — 77066 DX MAMMO INCL CAD BI: CPT

## 2020-10-12 NOTE — MM
Reason for exam: additional evaluation requested from prior study.

Last mammogram was performed 1 year ago.



History:

Patient is postmenopausal, has history of breast cancer at age 57, and is 

nulliparous.

Malignant US breast localization RT of the right breast, November 20, 2017.  US 

discontinued breast bx RT of the right breast, November 6, 2017.  Benign US biopsy

breast VAD RT of the right breast, September 29, 2017.  Benign cyst aspiration of

the right breast, August 2005.  2 cyst aspirations of the left breast.

Taking antineoplastic for 3 years beginning at age 57.



Physical Findings:

Nurse did not find any significant physical abnormalities on exam.



MG 3D Diag Mammo W/Cad GINETTE

Bilateral CC and MLO view(s) were taken.

Prior study comparison: October 9, 2019, bilateral MG 3d diag mammo w/cad GINETTE.  

April 8, 2019, right breast MG 3d diag mammo w/cad RT.

The breast tissue is heterogeneously dense. This may lower the sensitivity of 

mammography.  Post surgical distortion on right breast on 3D images from prior 

excision for LCIS.

No significant new findings when compared with previous films.



These results were verbally communicated with the patient and result sheet given 

to the patient on 10/12/20.





ASSESSMENT: Benign, BI-RAD 2



RECOMMENDATION:

Follow-up diagnostic mammogram of both breasts in 1 year.

## 2021-02-10 ENCOUNTER — HOSPITAL ENCOUNTER (OUTPATIENT)
Dept: HOSPITAL 47 - OR | Age: 61
Discharge: HOME | End: 2021-02-10
Attending: OPHTHALMOLOGY
Payer: MEDICAID

## 2021-02-10 VITALS — BODY MASS INDEX: 26.9 KG/M2

## 2021-02-10 VITALS — HEART RATE: 79 BPM | SYSTOLIC BLOOD PRESSURE: 150 MMHG | DIASTOLIC BLOOD PRESSURE: 84 MMHG

## 2021-02-10 VITALS — TEMPERATURE: 98 F

## 2021-02-10 VITALS — RESPIRATION RATE: 16 BRPM

## 2021-02-10 DIAGNOSIS — H52.13: ICD-10-CM

## 2021-02-10 DIAGNOSIS — I10: ICD-10-CM

## 2021-02-10 DIAGNOSIS — Z97.3: ICD-10-CM

## 2021-02-10 DIAGNOSIS — M81.0: ICD-10-CM

## 2021-02-10 DIAGNOSIS — Z82.49: ICD-10-CM

## 2021-02-10 DIAGNOSIS — Z79.899: ICD-10-CM

## 2021-02-10 DIAGNOSIS — Z98.890: ICD-10-CM

## 2021-02-10 DIAGNOSIS — Z83.518: ICD-10-CM

## 2021-02-10 DIAGNOSIS — Z82.61: ICD-10-CM

## 2021-02-10 DIAGNOSIS — Z79.83: ICD-10-CM

## 2021-02-10 DIAGNOSIS — I49.9: ICD-10-CM

## 2021-02-10 DIAGNOSIS — H04.129: ICD-10-CM

## 2021-02-10 DIAGNOSIS — H52.223: ICD-10-CM

## 2021-02-10 DIAGNOSIS — Z90.721: ICD-10-CM

## 2021-02-10 DIAGNOSIS — Z82.3: ICD-10-CM

## 2021-02-10 DIAGNOSIS — H52.4: ICD-10-CM

## 2021-02-10 DIAGNOSIS — H25.813: Primary | ICD-10-CM

## 2021-02-10 DIAGNOSIS — Z79.810: ICD-10-CM

## 2021-02-10 DIAGNOSIS — D05.01: ICD-10-CM

## 2021-02-10 PROCEDURE — 66984 XCAPSL CTRC RMVL W/O ECP: CPT

## 2021-02-10 RX ADMIN — CYCLOPENTOLATE HYDROCHLORIDE PRN DROPS: 10 SOLUTION/ DROPS OPHTHALMIC at 13:46

## 2021-02-10 RX ADMIN — POTASSIUM CHLORIDE SCH MLS: 14.9 INJECTION, SOLUTION INTRAVENOUS at 13:50

## 2021-02-10 RX ADMIN — POTASSIUM CHLORIDE SCH MLS: 14.9 INJECTION, SOLUTION INTRAVENOUS at 14:28

## 2021-02-10 RX ADMIN — PHENYLEPHRINE HYDROCHLORIDE PRN DROPS: 25 SOLUTION/ DROPS OPHTHALMIC at 13:43

## 2021-02-10 RX ADMIN — CYCLOPENTOLATE HYDROCHLORIDE PRN DROPS: 10 SOLUTION/ DROPS OPHTHALMIC at 13:40

## 2021-02-10 RX ADMIN — CYCLOPENTOLATE HYDROCHLORIDE PRN DROPS: 10 SOLUTION/ DROPS OPHTHALMIC at 13:34

## 2021-02-10 RX ADMIN — PHENYLEPHRINE HYDROCHLORIDE PRN DROPS: 25 SOLUTION/ DROPS OPHTHALMIC at 13:49

## 2021-02-10 RX ADMIN — PHENYLEPHRINE HYDROCHLORIDE PRN DROPS: 25 SOLUTION/ DROPS OPHTHALMIC at 13:37

## 2021-02-10 NOTE — OP
OPERATIVE REPORT



DATE OF SURGERY:

02/10/2021.



PROCEDURE:

Phacoemulsification of cataract and intraocular lens implant of the left eye.



PREOPERATIVE DIAGNOSES:

Nuclear sclerosis and cortical sclerosis and regular astigmatism.



POSTOPERATIVE DIAGNOSES:

Nuclear sclerosis and cortical sclerosis and regular astigmatism.



SURGEON:

Dr. Vic Davies.



ANESTHESIA:

Topical.



ESTIMATED BLOOD LOSS:

None.



SPECIMEN TAKEN:

None.



NARRATIVE:

After obtaining the appropriate consent, the patient was brought to the operating room.

There she was asked to sit upright, and the axes of zero and 180 degrees were

identified and marked on the patient's cornea.  She was then placed in the proper

supine position under cardiac monitoring, then prepped and draped in the usual sterile

manner.  She was approached from her left temporal side, and using previously acquired

corneal topography information, the axis of 83 degrees was identified and marked with a

Billogram axis marker. At the 5 o'clock position an MVR blade was used to create a

paracentesis port. Through this opening 1% xylocaine MPF 50:50 mix with balanced salt

solution was injected into the anterior chamber.  This was followed by stabilization of

the anterior chamber with Amvisc. At the 3 o'clock position, a 2.5 mm keratome was used

to create a self-sealing corneal flap incision. Through this opening, a cystotome was

introduced to begin a continuous tear capsulorrhexis which was then completed using the

Utrata forceps.  Hydrodissection and hydrodelineation of the lens was accomplished with

balanced salt solution.  Phacoemulsification of the lens utilizing phaco chop was

accomplished in 12.68 seconds at 19% power.  Additional xylocaine MPF was used to

further anesthetize the anterior chamber.  This was followed by irrigation and

aspiration of the remaining cortex along with careful polishing of the posterior

capsule in the capsule vacuum mode. Amvisc was then used to stabilize the capsular bag

and a Bausch and Lomb MX 60T 10.5 diopter x 2 diopter posterior chamber intraocular

lens was then inserted into the capsular bag without difficulty.  The remaining

viscoelastic was removed from in and around the intraocular lens and the intraocular

lens was then rotated so the index marks lined up with the axis of 83 degrees

previously placed on the patient's cornea.  The eye was then brought to normal

intraocular pressure through the paracentesis port and the wounds were confirmed

watertight.  She then received 2 drops of 0.5% Timolol followed by 2 drops of

moxifloxacin and was then lightly patched and shielded in the usual manner.  There were

no complications from the procedure.  She tolerated the procedure well and was returned

to [facility] in good condition.





MALGORZATA / ISABEL: [id number] / Job#: [id number]

## 2021-02-10 NOTE — P.OP
Date of Procedure: 02/10/21


Preoperative Diagnosis: 


NS & CS reg astigmatism


Postoperative Diagnosis: 


same


Procedure(s) Performed: 


PIOL, OS


Implants: 


[id number] 10.50


Anesthesia: MAC


Surgeon: Vic Davies


Pathology: none sent


Condition: stable


Disposition: same day


Indications for Procedure: 


blurry vision


Operative Findings: 


no complications

## 2021-02-24 ENCOUNTER — HOSPITAL ENCOUNTER (OUTPATIENT)
Dept: HOSPITAL 47 - OR | Age: 61
Discharge: HOME | End: 2021-02-24
Attending: OPHTHALMOLOGY
Payer: MEDICAID

## 2021-02-24 VITALS — TEMPERATURE: 98.7 F

## 2021-02-24 VITALS — DIASTOLIC BLOOD PRESSURE: 74 MMHG | RESPIRATION RATE: 20 BRPM | SYSTOLIC BLOOD PRESSURE: 158 MMHG | HEART RATE: 80 BPM

## 2021-02-24 VITALS — BODY MASS INDEX: 26.4 KG/M2

## 2021-02-24 DIAGNOSIS — Z83.518: ICD-10-CM

## 2021-02-24 DIAGNOSIS — D49.3: ICD-10-CM

## 2021-02-24 DIAGNOSIS — Z98.890: ICD-10-CM

## 2021-02-24 DIAGNOSIS — Z98.42: ICD-10-CM

## 2021-02-24 DIAGNOSIS — Z96.1: ICD-10-CM

## 2021-02-24 DIAGNOSIS — H43.813: ICD-10-CM

## 2021-02-24 DIAGNOSIS — H25.811: Primary | ICD-10-CM

## 2021-02-24 DIAGNOSIS — Z90.721: ICD-10-CM

## 2021-02-24 DIAGNOSIS — Z79.810: ICD-10-CM

## 2021-02-24 DIAGNOSIS — Z79.83: ICD-10-CM

## 2021-02-24 DIAGNOSIS — M81.0: ICD-10-CM

## 2021-02-24 DIAGNOSIS — Z82.3: ICD-10-CM

## 2021-02-24 DIAGNOSIS — Z79.899: ICD-10-CM

## 2021-02-24 DIAGNOSIS — Z82.61: ICD-10-CM

## 2021-02-24 DIAGNOSIS — H52.4: ICD-10-CM

## 2021-02-24 DIAGNOSIS — Z82.49: ICD-10-CM

## 2021-02-24 DIAGNOSIS — I10: ICD-10-CM

## 2021-02-24 DIAGNOSIS — H52.223: ICD-10-CM

## 2021-02-24 PROCEDURE — 66984 XCAPSL CTRC RMVL W/O ECP: CPT

## 2021-02-24 NOTE — P.OP
Date of Procedure: 02/24/21


Preoperative Diagnosis: 


NS & CS & reg astig


Postoperative Diagnosis: 


same


Procedure(s) Performed: 


PIOL, OD


Implants: 


MX60ET 4.25 12.0


Anesthesia: MAC


Surgeon: Vic Davies


Pathology: none sent


Condition: stable


Disposition: same day


Indications for Procedure: 


blurry vision


Operative Findings: 


no complications

## 2021-02-24 NOTE — OP
OPERATIVE REPORT



DATE OF SURGERY:

02/24/2021.



PROCEDURE:

Phacoemulsification of cataract and intraocular lens implantation of the right eye.



PREOPERATIVE DIAGNOSES:

Nuclear sclerosis, cortical sclerosis and regular astigmatism.



POSTOPERATIVE DIAGNOSES:

Nuclear sclerosis, cortical sclerosis and regular astigmatism.



SURGEON:

Dr. Vic Davies



ANESTHESIA:

Topical.



ESTIMATED BLOOD LOSS:

None.



SPECIMEN TAKEN:

None.



NARRATIVE:

After obtaining the appropriate consent, the patient was brought to the operating room.

There she was asked to sit upright, and the axes of zero and 180 degrees were

identified and marked on the patient's corneal limbus with a gentian violet marker.

She was then placed in the proper supine position under cardiac monitoring, then

prepped and draped in the usual sterile manner.  She was approached from her right

temporal side, and using previously acquired corneal topography information, the axes

of 80 degrees were identified with a [employer] axis marker. At the 11 o'clock position an

MVR blade was used to create a paracentesis port. Through this opening 1% xylocaine MPF

50:50 mix with balanced salt solution was injected into the anterior chamber.  This was

followed by stabilization of the anterior chamber with Amvisc.  At the 9 o'clock

position, a 2.5 mm keratome was used to create a self-sealing corneal flap incision.

Through this opening a cystotome was introduced to begin a continuous tear

capsulorrhexis which was then completed using the Utrata forceps.  Hydrodissection and

hydrodelineation of the lens were accomplished with balanced salt solution.

Phacoemulsification of the lens utilizing phacochop was accomplished in 6.21 seconds at

10% power.  Additional xylocaine MPF was instilled into the anterior chamber.  This was

followed by removal of the remaining cortex under irrigation and aspiration along with

careful polishing of the posterior capsule in the capsule vacuum mode. Additional

Amvisc was then used to stabilize the capsular bag, and a Bausch and Lomb MX 60ET

spherical equivalent 12 diopters cylinder of 4.25 diopter was inserted into the

capsular bag without difficulty.  After the remaining viscoelastic was removed from in

and around the intraocular lens, the lens was rotated to align with the previously

placed 80-degree marks that had been put on the patient's cornea.  The eye was then

brought to normal intraocular pressure through the paracentesis port with balanced salt

solution and the wounds were confirmed watertight.  She then received 2 drops of 0.5%

timolol followed by 2 drops of moxifloxacin, was then lightly patched and shielded in

the usual manner.  There were no complications from the procedure.  She tolerated the

procedure well and was returned to [facility] in good condition.





MALGORZATA / ISABEL: [id number] / Job#: [id number]

## 2021-04-08 ENCOUNTER — HOSPITAL ENCOUNTER (OUTPATIENT)
Dept: HOSPITAL 47 - LABWHC1 | Age: 61
Discharge: HOME | End: 2021-04-08
Attending: INTERNAL MEDICINE
Payer: MEDICAID

## 2021-04-08 DIAGNOSIS — M81.0: ICD-10-CM

## 2021-04-08 DIAGNOSIS — E78.2: ICD-10-CM

## 2021-04-08 DIAGNOSIS — R00.1: ICD-10-CM

## 2021-04-08 DIAGNOSIS — R94.31: ICD-10-CM

## 2021-04-08 DIAGNOSIS — C50.911: ICD-10-CM

## 2021-04-08 DIAGNOSIS — Z00.00: Primary | ICD-10-CM

## 2021-04-08 LAB
ALBUMIN SERPL-MCNC: 4.4 G/DL (ref 3.8–4.9)
ALBUMIN/GLOB SERPL: 2.59 G/DL (ref 1.6–3.17)
ALP SERPL-CCNC: 33 U/L (ref 41–126)
ALT SERPL-CCNC: 17 U/L (ref 8–44)
ANION GAP SERPL CALC-SCNC: 8.4 MMOL/L (ref 4–12)
AST SERPL-CCNC: 17 U/L (ref 13–35)
BASOPHILS # BLD AUTO: 0.04 X 10*3/UL (ref 0–0.1)
BASOPHILS NFR BLD AUTO: 0.6 %
BUN SERPL-SCNC: 17 MG/DL (ref 9–27)
BUN/CREAT SERPL: 21.25 RATIO (ref 12–20)
CALCIUM SPEC-MCNC: 9.4 MG/DL (ref 8.7–10.3)
CANCER AG15-3 SERPL-ACNC: 12.3 U/ML (ref 0–32.3)
CHLORIDE SERPL-SCNC: 100 MMOL/L (ref 96–109)
CHOLEST SERPL-MCNC: 170 MG/DL (ref 0–200)
CO2 SERPL-SCNC: 22.6 MMOL/L (ref 21.6–31.8)
EOSINOPHIL # BLD AUTO: 0.07 X 10*3/UL (ref 0.04–0.35)
EOSINOPHIL NFR BLD AUTO: 1.1 %
ERYTHROCYTE [DISTWIDTH] IN BLOOD BY AUTOMATED COUNT: 4.99 X 10*6/UL (ref 4.1–5.2)
ERYTHROCYTE [DISTWIDTH] IN BLOOD: 13.3 % (ref 11.5–14.5)
GLOBULIN SER CALC-MCNC: 1.7 G/DL (ref 1.6–3.3)
GLUCOSE SERPL-MCNC: 99 MG/DL (ref 70–110)
HCT VFR BLD AUTO: 43.4 % (ref 37.2–46.3)
HDLC SERPL-MCNC: 50 MG/DL (ref 40–60)
HGB BLD-MCNC: 14 G/DL (ref 12–15)
LDLC SERPL CALC-MCNC: 105.4 MG/DL (ref 0–131)
LYMPHOCYTES # SPEC AUTO: 1.51 X 10*3/UL (ref 0.9–5)
LYMPHOCYTES NFR SPEC AUTO: 24 %
MCH RBC QN AUTO: 28.1 PG (ref 27–32)
MCHC RBC AUTO-ENTMCNC: 32.3 G/DL (ref 32–37)
MCV RBC AUTO: 87 FL (ref 80–97)
MONOCYTES # BLD AUTO: 0.59 X 10*3/UL (ref 0.2–1)
MONOCYTES NFR BLD AUTO: 9.4 %
NEUTROPHILS # BLD AUTO: 4.05 X 10*3/UL (ref 1.8–7.7)
NEUTROPHILS NFR BLD AUTO: 64.4 %
PLATELET # BLD AUTO: 251 X 10*3/UL (ref 140–440)
POTASSIUM SERPL-SCNC: 4.2 MMOL/L (ref 3.5–5.5)
PROT SERPL-MCNC: 6.1 G/DL (ref 6.2–8.2)
SODIUM SERPL-SCNC: 131 MMOL/L (ref 135–145)
TRIGL SERPL-MCNC: 73 MG/DL (ref 0–149)
VLDLC SERPL CALC-MCNC: 14.6 MG/DL (ref 5–40)
WBC # BLD AUTO: 6.29 X 10*3/UL (ref 4.5–10)

## 2021-04-08 PROCEDURE — 82523 COLLAGEN CROSSLINKS: CPT

## 2021-04-08 PROCEDURE — 36415 COLL VENOUS BLD VENIPUNCTURE: CPT

## 2021-04-08 PROCEDURE — 80061 LIPID PANEL: CPT

## 2021-04-08 PROCEDURE — 84443 ASSAY THYROID STIM HORMONE: CPT

## 2021-04-08 PROCEDURE — 82378 CARCINOEMBRYONIC ANTIGEN: CPT

## 2021-04-08 PROCEDURE — 86300 IMMUNOASSAY TUMOR CA 15-3: CPT

## 2021-04-08 PROCEDURE — 85025 COMPLETE CBC W/AUTO DIFF WBC: CPT

## 2021-04-08 PROCEDURE — 80053 COMPREHEN METABOLIC PANEL: CPT

## 2021-04-08 PROCEDURE — 93005 ELECTROCARDIOGRAM TRACING: CPT

## 2021-10-13 ENCOUNTER — HOSPITAL ENCOUNTER (OUTPATIENT)
Dept: HOSPITAL 47 - RADMAMWWP | Age: 61
Discharge: HOME | End: 2021-10-13
Attending: INTERNAL MEDICINE
Payer: MEDICAID

## 2021-10-13 DIAGNOSIS — Z78.0: ICD-10-CM

## 2021-10-13 DIAGNOSIS — Z80.3: ICD-10-CM

## 2021-10-13 DIAGNOSIS — Z85.3: ICD-10-CM

## 2021-10-13 DIAGNOSIS — N64.89: Primary | ICD-10-CM

## 2021-10-13 PROCEDURE — 77066 DX MAMMO INCL CAD BI: CPT

## 2021-10-13 PROCEDURE — 77062 BREAST TOMOSYNTHESIS BI: CPT

## 2021-10-13 NOTE — MM
Reason for exam: additional evaluation requested from prior study.

Last mammogram was performed 1 year ago.



History:

Patient is postmenopausal, has history of breast cancer at age 57, and is 

nulliparous.

Family history of breast cancer in maternal cousin.

Malignant US breast localization RT of the right breast, November 20, 2017.  US 

discontinued breast bx RT of the right breast, November 6, 2017.  Benign US biopsy

breast VAD RT of the right breast, September 29, 2017.  Benign cyst aspiration of

the right breast, August 2005.  2 cyst aspirations of the left breast.

Taking antineoplastic for 4 years beginning at age 57.



Physical Findings:

Nurse did not find any significant physical abnormalities on exam.



MG 3D Diag Mammo W/Cad GINETTE

Bilateral CC and MLO view(s) were taken.

Prior study comparison: October 12, 2020, bilateral MG 3d diag mammo w/cad GINETTE.  

October 9, 2019, bilateral MG 3d diag mammo w/cad GINETTE.

Finding: There is a vague architectural distortion located 3 cm from the nipple in

the upper quadrant, middle position of the right breast, disperses with 

compression, not seen in CC.

New finding since October 12, 2020.



These results were verbally communicated with the patient and result sheet given 

to the patient on 10/13/21.





ASSESSMENT: Probably benign, BI-RAD 3



RECOMMENDATION:

Follow-up diagnostic mammogram of the right breast in 6 months.

(with compression and ML)

## 2022-04-11 ENCOUNTER — HOSPITAL ENCOUNTER (OUTPATIENT)
Dept: HOSPITAL 47 - RADUSWWP | Age: 62
Discharge: HOME | End: 2022-04-11
Attending: INTERNAL MEDICINE
Payer: MEDICAID

## 2022-04-11 DIAGNOSIS — I49.9: Primary | ICD-10-CM

## 2022-04-11 PROCEDURE — 93880 EXTRACRANIAL BILAT STUDY: CPT

## 2022-04-12 NOTE — US
EXAMINATION TYPE: US carotid duplex BILAT

 

DATE OF EXAM: 4/11/2022

 

COMPARISON: NONE

 

CLINICAL HISTORY: 61-year-old female I65.23 CAROTID STENOSIS. 

 

TECHNIQUE: Carotid duplex ultrasound examination. Indirect Doppler criteria was utilized.

 

FINDINGS:

 

EXAM MEASUREMENTS: 

 

RIGHT:  Peak Systolic Velocity (PSV) cm/sec

----- Right CCA:  85.3  

----- Right ICA:  85.3     

----- Right ECA:  91.4   

ICA/CCA ratio:  1.0    

 

RIGHT:  End Diastole cm/sec

----- Right CCA:  21.5   

----- Right ICA:  27.0      

----- Right ECA:  15.0     

 

LEFT:  Peak Systolic Velocity (PSV) cm/sec

----- Left CCA:  66.0  

----- Left ICA:  82.5   

----- Left ECA:  117.3  

ICA/CCA ratio:  1.3  

 

LEFT:  End Diastole cm/sec

----- Left CCA:  19.7  

----- Left ICA:  31.1   

----- Left ECA:  23.6 

 

VERTEBRALS (direction of flow):

Right Vertebral: Antegrade

Left Vertebral: Antegrade

 

Rhythm:  Arrhythmia

 

No significant stenosis seen

 

 

 

IMPRESSION:

 

1. No hemodynamically significant internal carotid artery stenosis on either side.

2. Note that the sonographer detects an irregular heart rhythm during a portion of the study.

 

 

 

Criteria for Assigning % of Stenosis / Diameter reduction

(Estimation based on the indirect measurements of the internal carotid artery velocities (ICA PSV).

1.  Normal (no stenosis)=ICA PSV < 125 cm/s: ratio < 2.0: ICA EDV<40 cm/s.

2. Less than 50% stenosis=ICA PSV < 125 cm/s: ratio < 2.0: ICA EDV<40 cm/s.

3.  50 to 69% stenosis=ICA PSV of 125 to 230 cm/s: ration 2.0 ? 4.0: ICA EDV  cm/s.

4.  Greater than 70% stenosis to near occlusion= ICA PSV > 230 cm/s: ratio > 4.0: ICA EDV > 100 cm/s.
 

5.  Near occlusion= ICA PSV velocities may be low or undetectable: variable ratio and ICA EDV.

6.  Total occlusion=unable to detect flow.

## 2022-04-15 ENCOUNTER — HOSPITAL ENCOUNTER (OUTPATIENT)
Dept: HOSPITAL 47 - LABWHC1 | Age: 62
Discharge: HOME | End: 2022-04-15
Attending: INTERNAL MEDICINE
Payer: MEDICAID

## 2022-04-15 DIAGNOSIS — M81.0: ICD-10-CM

## 2022-04-15 DIAGNOSIS — E78.2: ICD-10-CM

## 2022-04-15 DIAGNOSIS — Z00.00: Primary | ICD-10-CM

## 2022-04-15 DIAGNOSIS — I10: ICD-10-CM

## 2022-04-15 LAB
BASOPHILS # BLD AUTO: 0.04 X 10*3/UL (ref 0–0.1)
BASOPHILS NFR BLD AUTO: 0.5 %
EOSINOPHIL # BLD AUTO: 0.08 X 10*3/UL (ref 0.04–0.35)
EOSINOPHIL NFR BLD AUTO: 0.9 %
ERYTHROCYTE [DISTWIDTH] IN BLOOD BY AUTOMATED COUNT: 5.1 X 10*6/UL (ref 4.1–5.2)
ERYTHROCYTE [DISTWIDTH] IN BLOOD: 13.5 % (ref 11.5–14.5)
HCT VFR BLD AUTO: 44 % (ref 37.2–46.3)
HGB BLD-MCNC: 14.4 G/DL (ref 12–15)
IMM GRANULOCYTES BLD QL AUTO: 0.2 %
LYMPHOCYTES # SPEC AUTO: 2.1 X 10*3/UL (ref 0.9–5)
LYMPHOCYTES NFR SPEC AUTO: 24.5 %
MCH RBC QN AUTO: 28.2 PG (ref 27–32)
MCHC RBC AUTO-ENTMCNC: 32.7 G/DL (ref 32–37)
MCV RBC AUTO: 86.3 FL (ref 80–97)
MONOCYTES # BLD AUTO: 0.65 X 10*3/UL (ref 0.2–1)
MONOCYTES NFR BLD AUTO: 7.6 %
NEUTROPHILS # BLD AUTO: 5.67 X 10*3/UL (ref 1.8–7.7)
NEUTROPHILS NFR BLD AUTO: 66.3 %
NRBC BLD AUTO-RTO: 0 /100 WBCS (ref 0–0)
PH UR: 6 [PH] (ref 5–8)
PLATELET # BLD AUTO: 261 X 10*3/UL (ref 140–440)
SP GR UR: 1 (ref 1–1.03)
UROBILINOGEN UR QL STRIP: <2 MG/DL (ref ?–2)
WBC # BLD AUTO: 8.56 X 10*3/UL (ref 4.5–10)

## 2022-04-15 PROCEDURE — 36415 COLL VENOUS BLD VENIPUNCTURE: CPT

## 2022-04-15 PROCEDURE — 80053 COMPREHEN METABOLIC PANEL: CPT

## 2022-04-15 PROCEDURE — 81003 URINALYSIS AUTO W/O SCOPE: CPT

## 2022-04-15 PROCEDURE — 80061 LIPID PANEL: CPT

## 2022-04-15 PROCEDURE — 84439 ASSAY OF FREE THYROXINE: CPT

## 2022-04-15 PROCEDURE — 84443 ASSAY THYROID STIM HORMONE: CPT

## 2022-04-15 PROCEDURE — 83735 ASSAY OF MAGNESIUM: CPT

## 2022-04-15 PROCEDURE — 85025 COMPLETE CBC W/AUTO DIFF WBC: CPT

## 2022-04-15 PROCEDURE — 83036 HEMOGLOBIN GLYCOSYLATED A1C: CPT

## 2022-04-15 PROCEDURE — 82306 VITAMIN D 25 HYDROXY: CPT

## 2022-04-16 LAB
ALBUMIN SERPL-MCNC: 4.4 G/DL (ref 3.8–4.9)
ALBUMIN/GLOB SERPL: 1.8 G/DL (ref 1.6–3.17)
ALP SERPL-CCNC: 34 U/L (ref 41–126)
ALT SERPL-CCNC: 21 U/L (ref 8–44)
ANION GAP SERPL CALC-SCNC: 20.6 MMOL/L (ref 10–18)
AST SERPL-CCNC: 27 U/L (ref 13–35)
BUN SERPL-SCNC: 15.8 MG/DL (ref 9–27)
BUN/CREAT SERPL: 20.79 RATIO (ref 12–20)
CALCIUM SPEC-MCNC: 9.6 MG/DL (ref 8.7–10.3)
CHLORIDE SERPL-SCNC: 99 MMOL/L (ref 96–109)
CHOLEST SERPL-MCNC: 181 MG/DL (ref 0–200)
CO2 SERPL-SCNC: 15.5 MMOL/L (ref 20–27.5)
GLOBULIN SER CALC-MCNC: 2.5 G/DL (ref 1.6–3.3)
GLUCOSE SERPL-MCNC: 73 MG/DL (ref 70–110)
HDLC SERPL-MCNC: 55.3 MG/DL (ref 40–60)
LDLC SERPL CALC-MCNC: 113.1 MG/DL (ref 0–131)
MAGNESIUM SPEC-SCNC: 2.2 MG/DL (ref 1.5–2.4)
POTASSIUM SERPL-SCNC: 4.4 MMOL/L (ref 3.5–5.5)
PROT SERPL-MCNC: 6.9 G/DL (ref 6.2–8.2)
SODIUM SERPL-SCNC: 135 MMOL/L (ref 135–145)
T4 FREE SERPL-MCNC: 1.16 NG/DL (ref 0.8–1.8)
TRIGL SERPL-MCNC: 63.1 MG/DL (ref 0–149)
VLDLC SERPL CALC-MCNC: 12.62 MG/DL (ref 5–40)

## 2022-04-19 ENCOUNTER — HOSPITAL ENCOUNTER (OUTPATIENT)
Dept: HOSPITAL 47 - RADMAMWWP | Age: 62
Discharge: HOME | End: 2022-04-19
Attending: INTERNAL MEDICINE
Payer: MEDICAID

## 2022-04-19 DIAGNOSIS — R92.8: Primary | ICD-10-CM

## 2022-04-19 DIAGNOSIS — Z85.3: ICD-10-CM

## 2022-04-19 DIAGNOSIS — Z78.0: ICD-10-CM

## 2022-04-19 DIAGNOSIS — Z80.3: ICD-10-CM

## 2022-04-19 PROCEDURE — 77065 DX MAMMO INCL CAD UNI: CPT

## 2022-04-19 PROCEDURE — 77061 BREAST TOMOSYNTHESIS UNI: CPT

## 2022-04-19 NOTE — MM
Reason for exam: follow-up at short interval from prior study.

Last mammogram was performed 6 months ago.



History:

Patient is postmenopausal, has history of breast cancer at age 57, and is 

nulliparous.

Family history of breast cancer in maternal cousin.

Malignant US breast localization RT of the right breast, November 20, 2017.  US 

discontinued breast bx RT of the right breast, November 6, 2017.  Benign US biopsy

breast VAD RT of the right breast, September 29, 2017.  Benign cyst aspiration of

the right breast, August 2005.  2 cyst aspirations of the left breast.

Taking antineoplastic for 4 years beginning at age 57.



Physical Findings:

A clinical breast exam by your physician is recommended on an annual basis and 

results should be correlated with mammographic findings.



MG 3D Diag Mammo W/Cad RT

Spot compression CC, spot compression MLO, CC with magnification, LM with 

magnification, and LM view(s) were taken of the right breast.

Prior study comparison: October 13, 2021, bilateral MG 3d diag mammo w/cad GINETTE.  

October 12, 2020, bilateral MG 3d diag mammo w/cad GINETTE.

The breast tissue is heterogeneously dense. This may lower the sensitivity of 

mammography.

Finding #1: Architectural distortion located 3 cm from the nipple in the middle 

position of the right breast compresses. Does not appear to correlate with prior 

biopsy.

Finding #2: There are indeterminate, fine, regional calcifications in the middle 

position of the right breast CC view, not clearly evident on a number of images.

New finding since October 13, 2021 and October 12, 2020.



Results were given to the patient verbally at the time of the exam.





ASSESSMENT: Probably benign, BI-RAD 3



RECOMMENDATION:

Breast MRI of both breasts.

Follow-up diagnostic mammogram of the right breast in 3 months.

## 2022-04-20 ENCOUNTER — HOSPITAL ENCOUNTER (OUTPATIENT)
Dept: HOSPITAL 47 - RADBDWWP | Age: 62
Discharge: HOME | End: 2022-04-20
Attending: INTERNAL MEDICINE
Payer: MEDICAID

## 2022-04-20 DIAGNOSIS — M81.0: Primary | ICD-10-CM

## 2022-04-20 PROCEDURE — 77080 DXA BONE DENSITY AXIAL: CPT

## 2022-04-21 NOTE — BD
EXAMINATION TYPE: Bone Density

 

DATE OF EXAM: 2022

 

COMPARISON: 2020

 

CLINICAL HISTORY: 61 years year old Female.  ICD-10 CODE: M81.0 AGE RELATED OSTEOPOROSIS

 

Height:  59.5 IN

Weight:  134 LBS

 

 

 

RISK FACTORS 

HISTORY OF: 

History of Wrist Fracture: RT WRIST AGE 41

Family History of Osteoporosis: YES MOTHER

Active: YES

Postmenopausal woman: AGE 50

Lost more than 2 inches in height since high school: YES 2 "

 

MEDICATIONS: 

Osteoporosis Medications: YES

Which medication:  ALENDRONATE SODIUM

How Lon YEARS

Additional Medications: CALCIUM, MULTI VIT, AMLODIPINE, ALENDRONATE SODIUM, RAMIPRIL, TAMOXIFEN  

 

 

Additional History: BREAST CANCER

 

 

EXAM MEASUREMENTS: 

Bone mineral densitometry was performed using the [employer] System.

Bone mineral density as measured about the Lumbar spine is:  

----- L1-L4(G/cm2): 0.972

T Score Values are as follows:

----- L1: -1.6

----- L2: -2.3

----- L3: -1.9

----- L4: -1.3

----- L1-L4: -1.7

Bone mineral density has: Decreased -4.4% since study of: 2020

 

Bone mineral density about the R hip (g/cm2): 0.764

Bone mineral density about the L hip (g/cm2): 0.738

T Score values are as follows:

-----R Neck: -2.0

-----L Neck: -2.2

-----R Total: -1.2

-----L Total: -1.4

Bone mineral density has: Increased 1.4% since study of: 2020

 

 

FRAX%s: The graph provided illustrates a 18.5 chance for a major osteoporotic fx and a 3.1 chance for
 the hips probability for fx in 10 years time.

 

IMPRESSION:

Osteopenia (T Score between -2.5 and -1).

 

There is slightly increased risk of fracture and the patient may be considered 

for treatment. 

 

Re-Screen 2-5 years.

 

NOTE:  T-SCORE=SD OF THE YOUNG ADULT MEAN.

## 2022-05-10 ENCOUNTER — HOSPITAL ENCOUNTER (OUTPATIENT)
Dept: HOSPITAL 47 - RADCTMAIN | Age: 62
Discharge: HOME | End: 2022-05-10
Attending: INTERNAL MEDICINE
Payer: MEDICAID

## 2022-05-10 DIAGNOSIS — I25.10: ICD-10-CM

## 2022-05-10 DIAGNOSIS — Z13.6: Primary | ICD-10-CM

## 2022-05-10 PROCEDURE — 75571 CT HRT W/O DYE W/CA TEST: CPT

## 2022-05-11 NOTE — CT
EXAMINATION TYPE: CT heart w calcium score

 

DATE OF EXAM: 5/10/2022

 

COMPARISON: None.

 

HISTORY: Screening for cardiovascular disorder. 213.9

 

CT DLP: 67.2 mGycm

Automated exposure control for dose reduction was used.

 

CT CALCIUM SCORING

Coronary calcium is a marker for plaque (fatty deposits) in a blood vessel or atherosclerosis (harden
ing of the arteries).  The presence and amount of calcium detected in a coronary artery by the CT sca
n, indicates the presence and amount of atherosclerotic plaque.  These calcium deposits appear years 
before the development of heart disease symptoms such as chest pain and shortness of breath.

 

A calcium score is computed for each of the coronary arteries based upon the volume and density of th
e calcium deposits.  This can be referred to as your calcified plaque burden.  It does not correspond
 directly to the percentage of narrowing in the artery but does correlate with the severity of the un
derlying coronary atherosclerosis.

 

PROCEDURE

 

TECHNIQUE - Prospective Gating was used.  Slice thickness: 3mm.

Density threshold (HU): 130, Pixel threshold: 3, Algorithm: discrete.

 

RESULTS

 

Region:  LM

Calcium Score (Agatston): 0

Volume (mm3): 0

Mass (g): 0

 

Region:  RCA

Calcium Score (Agatston): 0

Volume (mm3): 0

Mass (g): 0

 

Region:  LAD

Calcium Score (Agatston): 0

Volume (mm3): 0

Mass (g): 0

 

Region:  CX

Calcium Score (Agatston): 0

Volume (mm3): 0

Mass (g): 0

 

Region:  PDA

Calcium Score (Agatston):  0

Volume (mm3):  0

Mass (g):  0

 

Total:

Calcium Score (Agatston):  0

Volume (mm3): 0

Mass (g):  0

 

Incidental findings: Nonspecific 1.4 cm hypodense lesion left hepatic lobe axial image 59. Lesion fav
ored benign based on small size. Consider liver protocol CT or MRI follow-up to further evaluate. Mil
d lateral left basilar linear scarring and/or atelectasis at axial image 50.

 

 

 

IMPRESSION: 

 

Calcium Score:  0

Implication:  No identifiable plaque.

Risk of Coronary Artery Disease: Very low, generally less than 5%.

 

 

 

CALCIUM SCORE    IMPLICATION                                                                RISK OF C
ORONARY ARTERY DISEASE

0                                    No identifiable plaque                                          
          Very low, generally less than 5%

1-10                              Minimal identifiable plaque                                        
    Very unlikely, less than 10%

                         Definite, at least mild atherosclerotic plaque               Mild or m
inimal coronary narrowings likely

101-400                       Definite, at least moderate atherosclerotic plaque     Mild coronary ar
abbey disease highly likely, significant narrowing possible

401 or Higher              Extensive atherosclerotic plaque                                  High lik
elihood of at least one significant coronary narrowing

## 2022-06-29 ENCOUNTER — HOSPITAL ENCOUNTER (OUTPATIENT)
Dept: HOSPITAL 47 - RADMRIMAIN | Age: 62
Discharge: HOME | End: 2022-06-29
Attending: INTERNAL MEDICINE
Payer: MEDICAID

## 2022-06-29 DIAGNOSIS — Z80.3: ICD-10-CM

## 2022-06-29 DIAGNOSIS — R92.8: Primary | ICD-10-CM

## 2022-06-29 PROCEDURE — 77049 MRI BREAST C-+ W/CAD BI: CPT

## 2022-07-06 NOTE — BMR
EXAMINATION TYPE: MR breast BILAT wo/w con

 

DATE OF EXAM: 7/29/2022

 

CLINICAL HISTORY: 

Clinical history is obtained from review of prior reports. Personal history of breast cancer at age [age] diagnosed November 2017. Family history includes: Breast cancer in maternal cousin. The patient is 
nulliparous.

 

TECHNIQUE: 

PULSE SEQUENCES: Multiplanar, multisequence MR images of the breast were obtained on a MRI imaging sc
sadaf. Coronal T2. Pre-contrast axial T2 fat-saturated, pre-contrast non-fat saturated T1, and one pr
e- and five post-contrast fat-saturated images were obtained of both breasts together with the breast
 coil. Delayed volume axial gradient-echo images were obtained of both breasts together with the patti
st coil. Post-processed subtraction and MIP reconstructions were then generated. Dynamic images were 
spatially registered using the [employer] software package, and dynamic curves and parametric
 images were evaluated. 

 

As part of the dynamic portion of this examination, intravenous contrast was administered the amount 
and rate was not provided. 

 

Field of view for the dynamic portion of this study was 34 cm. 

 

COMPARISON: 

Right breast mammogram dated 10/13/2021 and single CC view right breast dated 10/12/2020. Bilateral d
iagnostic breast mammogram October 13, 2021 BI-RADS 3. Diagnostic right breast mammogram April 19, 20
22 BI-RADS 3.

 

FINDINGS: 

The technical quality of this study is considered adequate to make a final assessment and recommendat
ion. 

 

There is extremely dense fibroglandular tissue bilaterally and minimal background enhancement. 

 

Coronal T2 sequences demonstrates normal bilateral axillary nodes. 

 

Axial T2 sequence demonstrates no cysts or fluid collections. 

 

Non fat saturated T1 sequence demonstrates no fat containing lesions. 

 

High resolution post contrast sequence demonstrates no internal mammary lymphadenopathy. 

 

Regarding the right breast: There are no masses or suspicious areas of enhancement. Architectural dis
tortion and hemosiderin deposition is seen at the 12 o'clock position consistent with known excisiona
l changes. 

 

Regarding the left breast: There are no masses, architectural distortion or suspicious areas of enhan
cement. 

 

A round 1.3 cm T2 hyperintense nonenhancing lesion left hepatic lobe axial image 1 likely reflect sim
ple thin-walled cyst.

 

IMPRESSION: 

Postsurgical changes in the right breast likely correspond to site of previous cancer surgery. 

 

No evidence of malignancy in the left breast. 

 

No lymphadenopathy. 

 

Recommendations: Patient is due for bilateral breast mammogram October 2022 to be back on annual sche
dule. 

 

BI-RADS category 2, benign right breast. 

 

BI-RADS category 1, negative left breast. 

 

Signed by: Reina Carmona 07/01/2022 17:18:15 

 

I Have reviewed and agree with the above outside dictation.

## 2022-10-04 ENCOUNTER — HOSPITAL ENCOUNTER (OUTPATIENT)
Dept: HOSPITAL 47 - ORWHC2ENDO | Age: 62
Discharge: HOME | End: 2022-10-04
Attending: SURGERY
Payer: MEDICAID

## 2022-10-04 VITALS — TEMPERATURE: 97.3 F

## 2022-10-04 VITALS — RESPIRATION RATE: 16 BRPM | SYSTOLIC BLOOD PRESSURE: 119 MMHG | HEART RATE: 76 BPM | DIASTOLIC BLOOD PRESSURE: 65 MMHG

## 2022-10-04 VITALS — BODY MASS INDEX: 28.6 KG/M2

## 2022-10-04 DIAGNOSIS — Z12.11: Primary | ICD-10-CM

## 2022-10-04 DIAGNOSIS — Z85.3: ICD-10-CM

## 2022-10-04 DIAGNOSIS — Z90.721: ICD-10-CM

## 2022-10-04 DIAGNOSIS — E78.5: ICD-10-CM

## 2022-10-04 DIAGNOSIS — I49.9: ICD-10-CM

## 2022-10-04 DIAGNOSIS — Z98.86: ICD-10-CM

## 2022-10-04 DIAGNOSIS — M81.0: ICD-10-CM

## 2022-10-04 DIAGNOSIS — Z80.0: ICD-10-CM

## 2022-10-04 DIAGNOSIS — F10.90: ICD-10-CM

## 2022-10-04 DIAGNOSIS — I10: ICD-10-CM

## 2022-10-04 DIAGNOSIS — K57.30: ICD-10-CM

## 2022-10-04 DIAGNOSIS — Z79.899: ICD-10-CM

## 2022-10-04 DIAGNOSIS — Z83.2: ICD-10-CM

## 2022-10-04 PROCEDURE — 45378 DIAGNOSTIC COLONOSCOPY: CPT

## 2022-10-04 NOTE — P.PCN
Date of Procedure: 10/04/22


Procedure(s) Performed: 


PREOPERATIVE DIAGNOSIS: Colon cancer screening


POSTOPERATIVE DIAGNOSIS: Diverticulosis


PROCEDURE: Colonoscopy 


ANESTHESIA: MAC


SURGEON: Loyd Downs M.D.


SPECIMENS: None


ENDOSCOPIC PROCEDURE:  The patient was placed on the endoscopy table in the left

decubitus position.  The Olympus colonoscope was inserted into the anus and 

passed under direct visualization to the base of the cecum.  The appendiceal 

orifice was visualized.  From that point the scope was slowly withdrawn inspe

cting all surfaces carefully.  There were no neoplastic inflammatory or polypoid

lesions throughout the cecum, ascending, transverse, descending, sigmoid and 

rectum.  There was mild left-sided diverticulosis noted.  Digital rectal 

examination was normal.  The patient was taken to the recovery room in stable 

condition per anesthesia guidelines.


RECOMMENDATIONS: Resume diet.  Follow-up colonoscopy 5-10 years given patient's 

personal history of breast cancer in uncle with history of colon cancer.

## 2022-10-04 NOTE — P.GSHP
History of Present Illness


H&P Date: 10/04/22


Chief Complaint: Colon cancer screening





62-year-old female here today for colonoscopy.  Last colonoscopy 10 years ago.  

No bowel complaints.  History of breast cancer.  Family history of colon cancer 

in her uncle.





Past Medical History


Past Medical History: Hyperlipidemia, Hypertension


Additional Past Medical History / Comment(s): osteoporosis,arrythmia


History of Any Multi-Drug Resistant Organisms: None Reported


Past Surgical History: Breast Surgery


Additional Past Surgical History / Comment(s): ovarian cyst removal, 

Oophorectomy left,  Breast biopsies-benign


Past Anesthesia/Blood Transfusion Reactions: No Reported Reaction


Past Psychological History: No Psychological Hx Reported


Smoking Status: Never smoker


Past Alcohol Use History: Occasional


Past Drug Use History: None Reported





- Past Family History


  ** Mother


Family Medical History: Deep Vein Thrombosis (DVT)





Medications and Allergies


                                Home Medications











 Medication  Instructions  Recorded  Confirmed  Type


 


ramipriL [Altace] 10 mg PO QAM 09/25/17 09/30/22 History


 


Alendronate Sodium 70 mg PO BERUMEN 02/08/21 09/30/22 History


 


Tamoxifen [Nolvadex] 20 mg PO DAILY 02/08/21 09/30/22 History


 


Sam/D3/Mag11/Zinc//Juan José/Bor 1 each PO BID 02/09/21 09/30/22 History





[Caltrate 600+D Plus Tablet]    


 


Multivit-Min/Iron/Folic/Lutein 1 each PO DAILY 02/09/21 09/30/22 History





[Centrum Silver Women Tablet]    


 


Atorvastatin [Lipitor] 20 mg PO DAILY 09/30/22 09/30/22 History


 


amLODIPine BESYLATE 2.5 mg PO DAILY 09/30/22 09/30/22 History








                                    Allergies











Allergy/AdvReac Type Severity Reaction Status Date / Time


 


No Known Allergies Allergy   Verified 09/30/22 13:43














Surgical - Exam


                                   Vital Signs











Temp Pulse Resp BP Pulse Ox


 


 97.3 F L  78   18   153/73   97 


 


 10/04/22 09:07  10/04/22 09:07  10/04/22 09:07  10/04/22 09:07  10/04/22 09:07

















Physical exam:


General: Well-developed, well-nourished


HEENT: Normocephalic, sclerae nonicteric


Abdomen: Nontender, nondistended


Extremities: No edema


Neuro: Alert and oriented





Assessment and Plan


(1) Colon cancer screening


Narrative/Plan: 


Will proceed with colonoscopy at this time


Current Visit: Yes   Status: Acute   Code(s): Z12.11 - ENCOUNTER FOR SCREENING 

FOR MALIGNANT NEOPLASM OF COLON   SNOMED Code(s): 289316758

## 2022-10-17 ENCOUNTER — HOSPITAL ENCOUNTER (OUTPATIENT)
Dept: HOSPITAL 47 - RADMAMWWP | Age: 62
Discharge: HOME | End: 2022-10-17
Attending: INTERNAL MEDICINE
Payer: MEDICAID

## 2022-10-17 DIAGNOSIS — Z78.0: ICD-10-CM

## 2022-10-17 DIAGNOSIS — R92.8: Primary | ICD-10-CM

## 2022-10-17 DIAGNOSIS — Z85.3: ICD-10-CM

## 2022-10-17 PROCEDURE — 77062 BREAST TOMOSYNTHESIS BI: CPT

## 2022-10-17 PROCEDURE — 77066 DX MAMMO INCL CAD BI: CPT

## 2022-10-17 NOTE — MM
Reason for Exam: Additional evaluation requested from prior study. 

Last screening mammogram was performed 12 month(s) ago.





Patient History: 

Menarche at age 10. Patient has no children. Left ovary removed at age 37. Postmenopausal. Breast

cancer, right, age 57. 08/2005, Benign Cyst Aspiration on the right side. Cyst Aspiration on the

Left side. Cyst Aspiration on the Left side. 11/20/2017, Malignant Core Biopsy on the right side.

9/29/2017, Benign Core Biopsy on the right side. 11/6/2017, US discontinued breast bx RT on the

right side.

Maternal cousin had breast cancer. 





Prior Study Comparison: 

11/20/2017 Right Diagnostic Mammogram, [facility]. 4/2/2018 Right Diagnostic Mammogram, [facility]. 10/3/2018

Bilateral Diagnostic Mammogram, [facility]. 4/8/2019 Right Diagnostic Mammogram, [facility]. 10/9/2019 Bilateral

Diagnostic Mammogram, [facility]. 10/12/2020 Bilateral Diagnostic Mammogram, [facility]. 10/13/2021 Bilateral

Diagnostic Mammogram, [facility]. 4/19/2022 Right Diagnostic Mammogram, [facility]. 





Tissue Density: 

The breast tissue is heterogeneously dense. This may lower the sensitivity of mammography.





Findings: 

Analyzed By CAD. 

No new suspicious mass or worrisome cluster microcalcifications within either breast. Similar

architectural distortion within the right breast related to postsurgical change. 





Overall Assessment: Benign, BI-RAD 2





Management: 

Screening Mammogram of both breasts in 1 year.

A clinical breast exam by your physician is recommended on an annual basis and results should be

correlated with mammographic findings.  This exam should not preclude additional follow-up of

suspicious palpable abnormalities.  Results were given to the patient verbally at the time of exam.



Electronically signed and approved by: Kyle Jin D.O.

## 2023-04-15 ENCOUNTER — HOSPITAL ENCOUNTER (OUTPATIENT)
Dept: HOSPITAL 47 - LABWHC1 | Age: 63
Discharge: HOME | End: 2023-04-15
Attending: INTERNAL MEDICINE
Payer: MEDICAID

## 2023-04-15 DIAGNOSIS — E78.2: ICD-10-CM

## 2023-04-15 DIAGNOSIS — I10: ICD-10-CM

## 2023-04-15 DIAGNOSIS — Z00.00: Primary | ICD-10-CM

## 2023-04-15 LAB
BASOPHILS # BLD AUTO: 0.05 X 10*3/UL (ref 0–0.1)
BASOPHILS NFR BLD AUTO: 0.7 %
EOSINOPHIL # BLD AUTO: 0.12 X 10*3/UL (ref 0.04–0.35)
EOSINOPHIL NFR BLD AUTO: 1.6 %
ERYTHROCYTE [DISTWIDTH] IN BLOOD BY AUTOMATED COUNT: 5.21 X 10*6/UL (ref 4.1–5.2)
ERYTHROCYTE [DISTWIDTH] IN BLOOD: 13.2 % (ref 11.5–14.5)
HCT VFR BLD AUTO: 45.4 % (ref 37.2–46.3)
HGB BLD-MCNC: 14.6 G/DL (ref 12–15)
IMM GRANULOCYTES BLD QL AUTO: 0.4 %
LYMPHOCYTES # SPEC AUTO: 1.83 X 10*3/UL (ref 0.9–5)
LYMPHOCYTES NFR SPEC AUTO: 24.6 %
MCH RBC QN AUTO: 28 PG (ref 27–32)
MCHC RBC AUTO-ENTMCNC: 32.2 G/DL (ref 32–37)
MCV RBC AUTO: 87.1 FL (ref 80–97)
MONOCYTES # BLD AUTO: 0.75 X 10*3/UL (ref 0.2–1)
MONOCYTES NFR BLD AUTO: 10.1 %
NEUTROPHILS # BLD AUTO: 4.66 X 10*3/UL (ref 1.8–7.7)
NEUTROPHILS NFR BLD AUTO: 62.6 %
NRBC BLD AUTO-RTO: 0 /100 WBCS (ref 0–0)
PLATELET # BLD AUTO: 266 X 10*3/UL (ref 140–440)
WBC # BLD AUTO: 7.44 X 10*3/UL (ref 4.5–10)

## 2023-04-15 PROCEDURE — 36415 COLL VENOUS BLD VENIPUNCTURE: CPT

## 2023-04-15 PROCEDURE — 83735 ASSAY OF MAGNESIUM: CPT

## 2023-04-15 PROCEDURE — 83036 HEMOGLOBIN GLYCOSYLATED A1C: CPT

## 2023-04-15 PROCEDURE — 84443 ASSAY THYROID STIM HORMONE: CPT

## 2023-04-15 PROCEDURE — 85025 COMPLETE CBC W/AUTO DIFF WBC: CPT

## 2023-04-15 PROCEDURE — 80061 LIPID PANEL: CPT

## 2023-04-15 PROCEDURE — 80053 COMPREHEN METABOLIC PANEL: CPT

## 2023-04-15 PROCEDURE — 84439 ASSAY OF FREE THYROXINE: CPT

## 2023-04-15 PROCEDURE — 81001 URINALYSIS AUTO W/SCOPE: CPT

## 2023-04-16 LAB
ALBUMIN SERPL-MCNC: 4.6 G/DL (ref 3.8–4.9)
ALBUMIN/GLOB SERPL: 2.04 G/DL (ref 1.6–3.17)
ALP SERPL-CCNC: 42 U/L (ref 41–126)
ALT SERPL-CCNC: 20 U/L (ref 8–44)
ANION GAP SERPL CALC-SCNC: 13.6 MMOL/L (ref 10–18)
AST SERPL-CCNC: 22 U/L (ref 13–35)
BUN SERPL-SCNC: 16.9 MG/DL (ref 9–27)
BUN/CREAT SERPL: 21.98 RATIO (ref 12–20)
CALCIUM SPEC-MCNC: 9.7 MG/DL (ref 8.7–10.3)
CHLORIDE SERPL-SCNC: 102 MMOL/L (ref 96–109)
CHOLEST SERPL-MCNC: 138 MG/DL (ref 0–200)
CO2 SERPL-SCNC: 21.7 MMOL/L (ref 20–27.5)
GLOBULIN SER CALC-MCNC: 2.3 G/DL (ref 1.6–3.3)
GLUCOSE SERPL-MCNC: 81 MG/DL (ref 70–110)
HDLC SERPL-MCNC: 55.6 MG/DL (ref 40–60)
LDLC SERPL CALC-MCNC: 70.3 MG/DL (ref 0–131)
MAGNESIUM SPEC-SCNC: 2.4 MG/DL (ref 1.5–2.4)
PH UR: 6.5 [PH] (ref 5–8)
POTASSIUM SERPL-SCNC: 4.3 MMOL/L (ref 3.5–5.5)
PROT SERPL-MCNC: 6.9 G/DL (ref 6.2–8.2)
SODIUM SERPL-SCNC: 137 MMOL/L (ref 135–145)
SP GR UR: 1 (ref 1–1.03)
T4 FREE SERPL-MCNC: 1.15 NG/DL (ref 0.8–1.8)
TRIGL SERPL-MCNC: 60.7 MG/DL (ref 0–149)
UROBILINOGEN UR QL: 0.2
VLDLC SERPL CALC-MCNC: 12.14 MG/DL (ref 5–40)

## 2023-04-21 ENCOUNTER — HOSPITAL ENCOUNTER (OUTPATIENT)
Dept: HOSPITAL 47 - RADUSWWP | Age: 63
Discharge: HOME | End: 2023-04-21
Attending: INTERNAL MEDICINE
Payer: MEDICAID

## 2023-04-21 DIAGNOSIS — K76.89: Primary | ICD-10-CM

## 2023-04-21 DIAGNOSIS — N28.1: ICD-10-CM

## 2023-04-21 PROCEDURE — 76705 ECHO EXAM OF ABDOMEN: CPT

## 2023-04-21 NOTE — US
EXAMINATION TYPE: US liver

 

DATE OF EXAM: 4/21/2023

 

COMPARISON: NONE

 

CLINICAL INDICATION: Female, 62 years old with history of K76.89; liver cyst seen on recent CT

 

TECHNIQUE: Multiple sonographic images of the right upper quadrant are obtained.

 

FINDINGS:

 

EXAM MEASUREMENTS:

 

Liver Length:  13.9 cm   

Gallbladder Wall:  0.2 cm   

CBD:  0.6 cm

Right Kidney:  9.5 x 4.9 x 4.5 cm

 

SONOGRAPHER NOTES:rolled LLD due to bowel gas

 

Pancreas:  wnl

Liver:  1.5 x 1.7 x 0.9cm anterior right lobe cyst

Gallbladder:  wnl

**Evidence for sonographic Solorio's sign:  no

CBD:  wnl 

Right Kidney:  2 anechoic areas seen, largest mid = 3.5 x 4.3 x 2.6cm 

 

 

 

IMPRESSION: 

 

1. Renal cysts and a hepatic cyst.

## 2023-10-18 ENCOUNTER — HOSPITAL ENCOUNTER (OUTPATIENT)
Dept: HOSPITAL 47 - RADMAMWWP | Age: 63
Discharge: HOME | End: 2023-10-18
Attending: INTERNAL MEDICINE
Payer: MEDICAID

## 2023-10-18 DIAGNOSIS — Z12.31: Primary | ICD-10-CM

## 2023-10-18 DIAGNOSIS — Z85.3: ICD-10-CM

## 2023-10-18 DIAGNOSIS — Z78.0: ICD-10-CM

## 2023-10-18 DIAGNOSIS — Z80.3: ICD-10-CM

## 2023-10-18 PROCEDURE — 77067 SCR MAMMO BI INCL CAD: CPT

## 2023-10-18 PROCEDURE — 77063 BREAST TOMOSYNTHESIS BI: CPT

## 2023-10-19 NOTE — MM
Reason for Exam: Screening  (asymptomatic). 

Last screening mammogram was performed 12 month(s) ago.





Patient History: 

Menarche at age 10. Patient has no children. Left ovary removed at age 37. Postmenopausal. Breast

cancer, right, age 57. 08/2005, Benign Cyst Aspiration on the right side. Cyst Aspiration on the

Left side. Cyst Aspiration on the Left side. 11/20/2017, Malignant Core Biopsy on the right side.

9/29/2017, Benign Core Biopsy on the right side. 11/6/2017, US discontinued breast bx RT on the

right side.

Maternal cousin had breast cancer. 





Prior Study Comparison: 

10/13/2021 Bilateral Diagnostic Mammogram, [facility]. 4/19/2022 Right Diagnostic Mammogram, [facility].

10/17/2022 Bilateral MG 3D diag mammo w/cad GINETTE, [facility]. 





Tissue Density: 

The breast tissue is heterogeneously dense. This may lower the sensitivity of mammography.





Findings: 

Analyzed By CAD. 

There is no suspicious group of microcalcifications or new suspicious mass. 





Overall Assessment: Negative, BI-RAD 1





Management: 

Screening Mammogram of both breasts in 1 year.

[facility] will attempt to contact patient to return for supplemental views and

ultrasound if indicated.



Patient should continue monthly self-breast exams.  A clinical breast exam by your physician is

recommended on an annual basis.

This exam should not preclude additional follow-up of suspicious palpable abnormalities.



Note on Ginny scores and lifetime risk:

1. A Ginny score greater than 3% is considered moderate risk. If this is the case, consider

specialist referral to assess eligibility for a risk reducing agent.

2. If overall lifetime risk for the development of breast cancer is 20% or higher, the patient may

qualify for future screening with alternating mammogram and breast MRI.



Electronically signed and approved by: Antolin Andrew DO

## 2024-04-10 ENCOUNTER — HOSPITAL ENCOUNTER (OUTPATIENT)
Dept: HOSPITAL 47 - RADXRMAIN | Age: 64
Discharge: HOME | End: 2024-04-10
Attending: INTERNAL MEDICINE
Payer: MEDICAID

## 2024-04-10 DIAGNOSIS — M25.561: Primary | ICD-10-CM

## 2024-04-11 NOTE — XR
EXAMINATION TYPE: XR knee limited RT

 

DATE OF EXAM: 4/10/2024

 

CLINICAL HISTORY: pain

 

TECHNIQUE:  Three views of the right knee are obtained.

 

COMPARISON: None.

 

FINDINGS:  There is no acute fracture/dislocation.  The tri-compartment joint spaces appear within no
rmal limits.  The overlying soft tissue appears unremarkable. Small suprapatellar  joint effusion.

 

IMPRESSION:  There is no acute fracture or dislocation.ICD 10 NO FRACTURE, INITIAL EVALUATION

## 2024-04-17 ENCOUNTER — HOSPITAL ENCOUNTER (OUTPATIENT)
Dept: HOSPITAL 47 - RADBDWWP | Age: 64
Discharge: HOME | End: 2024-04-17
Attending: INTERNAL MEDICINE
Payer: MEDICAID

## 2024-04-17 DIAGNOSIS — M85.89: ICD-10-CM

## 2024-04-17 DIAGNOSIS — I65.23: ICD-10-CM

## 2024-04-17 DIAGNOSIS — Z13.6: Primary | ICD-10-CM

## 2024-04-17 PROCEDURE — 77080 DXA BONE DENSITY AXIAL: CPT

## 2024-04-18 NOTE — BD
EXAMINATION TYPE: Axial Bone Density

 

DATE OF EXAM: 4/17/2024

 

CLINICAL HISTORY: 63 years old Female.  ICD-10 CODE: M81.0 AGE RELATED OSTEOPOROSIS

 

Height:  4 ft 11 in

Weight:  133

 

FRAX RISK QUESTIONS:

 

Alcohol (3 or more units per day):  no

Family History (Parent hip fracture):  no

Glucocorticoids (More than 3mos):  no

           (Ex: prednisone, prednisolone, methylprednisolone, dexamethasone, and hydrocortisone).    
     

History of Fracture in Adulthood: yes

Secondary Osteoporosis:

  1.  Type 1 Diabetes: no

  2.  Hyperthyroidism: no

  3.  Menopause before 45: no

  4.  Malnutrition: no

  5.  Chronic liver disease: no

Rheumatoid Arthritis: no

Current Tobacco Use: no

 

RISK FACTORS 

 

HISTORY OF: 

History of Wrist Fracture: rt 

When: 28 years

Surgery to Spine/Hip(right/left)/Wrist (right/left): no

MEDICATIONS: 

Thyroid Medications:  none

Osteoporosis Medications: yes

Which medication:  fosamax

How Long: approx 15 years

 

EXAM MEASUREMENTS: 

Bone mineral densitometry was performed using the [employer] System.

Bone mineral density as measured about the Lumbar spine is:  

----- L1-L4(G/cm2): 0.973

T Score Values are as follows:

----- L1: -2.0

----- L2: -2.1

----- L3: -1.9

----- L4: -1.1

----- L1-L4: -1.7

 

Z Score Values are as follows:

----- L1: -0.3

----- L2: -0.3

----- L3: -0.1

----- L4: 0.6

----- L1-L4: 0.0

 

Bone mineral density has: increased 0.1 % since study of: 2022

 

Bone mineral density about the R hip (g/cm2): 0.804

Bone mineral density about the L hip (g/cm2): 0.761

T Score values are as follows:

-----R Neck: -1.7

-----L Neck: -2.0

-----R Total: -1.1

-----L Total: -1.4

 

Z Score values are as follows:

-----R Neck: -0.1

-----L Neck: -0.4

-----R Total: 0.2

-----L Total: 0.0

 

Bone mineral density has: increased  0.8 % since study of: 2022

 

 

FRAX%s: The graph provided illustrates a 17.9 % chance for a major osteoporotic fx and a 2.8 % chance
 for the hips probability for fx in 10 years time.

 

 

 

 

IMPRESSION:

Osteopenia (T Score between -2.5 and -1).

 

There is slightly increased risk of fracture and the patient may be considered 

for treatment. 

 

Re-Screen 2-5 years.

 

NOTE:  T-SCORE=SD OF THE YOUNG ADULT MEAN.

## 2024-04-20 ENCOUNTER — HOSPITAL ENCOUNTER (OUTPATIENT)
Dept: HOSPITAL 47 - LABWHC1 | Age: 64
Discharge: HOME | End: 2024-04-20
Attending: INTERNAL MEDICINE
Payer: MEDICAID

## 2024-04-20 DIAGNOSIS — Z00.00: Primary | ICD-10-CM

## 2024-04-20 DIAGNOSIS — M81.0: ICD-10-CM

## 2024-04-20 DIAGNOSIS — E78.2: ICD-10-CM

## 2024-04-20 DIAGNOSIS — I10: ICD-10-CM

## 2024-04-20 LAB
ALBUMIN SERPL-MCNC: 4.4 G/DL (ref 3.8–4.9)
ALBUMIN/GLOB SERPL: 1.91 RATIO (ref 1.6–3.17)
ALP SERPL-CCNC: 39 U/L (ref 41–126)
ALT SERPL-CCNC: 19 U/L (ref 8–44)
ANION GAP SERPL CALC-SCNC: 13.1 MMOL/L (ref 4–12)
AST SERPL-CCNC: 22 U/L (ref 13–35)
BASOPHILS # BLD AUTO: 0.06 X 10*3/UL (ref 0–0.1)
BASOPHILS NFR BLD AUTO: 0.7 %
BUN SERPL-SCNC: 14.4 MG/DL (ref 9–27)
BUN/CREAT SERPL: 20.57 RATIO (ref 12–20)
CALCIUM SPEC-MCNC: 9.9 MG/DL (ref 8.7–10.3)
CHLORIDE SERPL-SCNC: 99 MMOL/L (ref 96–109)
CHOLEST SERPL-MCNC: 129 MG/DL (ref 0–200)
CO2 SERPL-SCNC: 22.9 MMOL/L (ref 21.6–31.8)
EOSINOPHIL # BLD AUTO: 0.11 X 10*3/UL (ref 0.04–0.35)
EOSINOPHIL NFR BLD AUTO: 1.3 %
ERYTHROCYTE [DISTWIDTH] IN BLOOD BY AUTOMATED COUNT: 5.38 X 10*6/UL (ref 4.1–5.2)
ERYTHROCYTE [DISTWIDTH] IN BLOOD: 13.4 % (ref 11.5–14.5)
GLOBULIN SER CALC-MCNC: 2.3 G/DL (ref 1.6–3.3)
GLUCOSE SERPL-MCNC: 93 MG/DL (ref 70–110)
HCT VFR BLD AUTO: 45.6 % (ref 37.2–46.3)
HDLC SERPL-MCNC: 46.5 MG/DL (ref 40–60)
HGB BLD-MCNC: 14.8 G/DL (ref 12–15)
IMM GRANULOCYTES BLD QL AUTO: 0.6 %
LDLC SERPL CALC-MCNC: 69.9 MG/DL (ref 0–131)
LYMPHOCYTES # SPEC AUTO: 2.37 X 10*3/UL (ref 0.9–5)
LYMPHOCYTES NFR SPEC AUTO: 28.1 %
MAGNESIUM SPEC-SCNC: 2 MG/DL (ref 1.5–2.4)
MCH RBC QN AUTO: 27.5 PG (ref 27–32)
MCHC RBC AUTO-ENTMCNC: 32.5 G/DL (ref 32–37)
MCV RBC AUTO: 84.8 FL (ref 80–97)
MONOCYTES # BLD AUTO: 0.85 X 10*3/UL (ref 0.2–1)
MONOCYTES NFR BLD AUTO: 10.1 %
NEUTROPHILS # BLD AUTO: 4.98 X 10*3/UL (ref 1.8–7.7)
NEUTROPHILS NFR BLD AUTO: 59.2 %
NRBC BLD AUTO-RTO: 0 X 10*3/UL (ref 0–0.01)
PLATELET # BLD AUTO: 328 X 10*3/UL (ref 140–440)
POTASSIUM SERPL-SCNC: 4.4 MMOL/L (ref 3.5–5.5)
PROT SERPL-MCNC: 6.7 G/DL (ref 6.2–8.2)
SODIUM SERPL-SCNC: 135 MMOL/L (ref 135–145)
TRIGL SERPL-MCNC: 63 MG/DL (ref 0–149)
URATE SERPL-MCNC: 3.7 MG/DL (ref 2.9–7.7)
VLDLC SERPL CALC-MCNC: 12.6 MG/DL (ref 5–40)
WBC # BLD AUTO: 8.42 X 10*3/UL (ref 4.5–10)

## 2024-04-20 PROCEDURE — 82306 VITAMIN D 25 HYDROXY: CPT

## 2024-04-20 PROCEDURE — 80061 LIPID PANEL: CPT

## 2024-04-20 PROCEDURE — 84443 ASSAY THYROID STIM HORMONE: CPT

## 2024-04-20 PROCEDURE — 80053 COMPREHEN METABOLIC PANEL: CPT

## 2024-04-20 PROCEDURE — 85025 COMPLETE CBC W/AUTO DIFF WBC: CPT

## 2024-04-20 PROCEDURE — 83735 ASSAY OF MAGNESIUM: CPT

## 2024-04-20 PROCEDURE — 84550 ASSAY OF BLOOD/URIC ACID: CPT

## 2024-04-20 PROCEDURE — 83036 HEMOGLOBIN GLYCOSYLATED A1C: CPT

## 2024-04-20 PROCEDURE — 36415 COLL VENOUS BLD VENIPUNCTURE: CPT

## 2024-04-20 PROCEDURE — 82523 COLLAGEN CROSSLINKS: CPT

## 2024-05-06 ENCOUNTER — HOSPITAL ENCOUNTER (OUTPATIENT)
Dept: HOSPITAL 47 - RADUSWWP | Age: 64
Discharge: HOME | End: 2024-05-06
Attending: INTERNAL MEDICINE
Payer: MEDICAID

## 2024-05-06 DIAGNOSIS — Z13.6: Primary | ICD-10-CM

## 2024-05-06 DIAGNOSIS — I65.23: ICD-10-CM

## 2024-05-06 DIAGNOSIS — I10: ICD-10-CM

## 2024-05-06 DIAGNOSIS — E78.5: ICD-10-CM

## 2024-05-06 PROCEDURE — 75571 CT HRT W/O DYE W/CA TEST: CPT

## 2024-05-06 PROCEDURE — 93880 EXTRACRANIAL BILAT STUDY: CPT

## 2024-05-06 NOTE — US
EXAMINATION TYPE: US carotid duplex BILAT

 

DATE OF EXAM: 5/6/2024

 

COMPARISON: NONE

 

CLINICAL INDICATION: Female, 63 years old with history of I65.23 Carotid Stenosis,; Hypertension, hyp
erlipidemia.

 

TECHNIQUE: Carotid duplex ultrasound examination. Indirect Doppler criteria was utilized.

 

FINDINGS:

 

EXAM MEASUREMENTS: 

 

RIGHT:  Peak Systolic Velocity (PSV) cm/sec

----- Right CCA:  63.5  

----- Right ICA:  99.7     

----- Right ECA:  67.2   

ICA/CCA ratio:  1.57    

 

RIGHT:  End Diastole cm/sec

----- Right CCA:  16.8   

----- Right ICA:  31.5      

----- Right ECA:  13.6     

 

LEFT:  Peak Systolic Velocity (PSV) cm/sec

----- Left CCA:  74.0  

----- Left ICA:  75.6   

----- Left ECA:  70.9  

ICA/CCA ratio:  1.02  

 

LEFT:  End Diastole cm/sec

----- Left CCA:  19.4  

----- Left ICA:  28.7   

----- Left ECA:  11.3 

 

VERTEBRALS (direction of flow):

Right Vertebral: Antegrade

Left Vertebral: Antegrade

 

Rhythm:  Arrhythmia

 

SONOGRAPHER NOTES: Intimal thickening seen bilateral carotid systems. No elevated velocities at this 
time.  Right ICA appears tortuous.

 

 

 

 

IMPRESSION:  

 

Less than 50% stenosis of bilateral carotid bifurcations.

 

 

 

 

 

Criteria for Assigning % of Stenosis / Diameter reduction

(Estimation based on the indirect measurements of the internal carotid artery velocities (ICA PSV).

1.  Normal (no stenosis)=ICA PSV < 125 cm/s: ratio < 2.0: ICA EDV<40 cm/s.

2. Less than 50% stenosis=ICA PSV < 125 cm/s: ratio < 2.0: ICA EDV<40 cm/s.

3.  50 to 69% stenosis=ICA PSV of 125 to 230 cm/s: ration 2.0 ? 4.0: ICA EDV  cm/s.

4.  Greater than 70% stenosis to near occlusion= ICA PSV > 230 cm/s: ratio > 4.0: ICA EDV > 100 cm/s.
 

5.  Near occlusion= ICA PSV velocities may be low or undetectable: variable ratio and ICA EDV.

6.  Total occlusion=unable to detect flow.

## 2024-05-07 NOTE — CT
EXAMINATION TYPE: CT heart w calcium score

 

DATE OF EXAM: 5/6/2024

 

COMPARISON: 5/10/2022

 

HISTORY: Screening for cardiovascular disorder. 213.9

 

CT DLP: 101.1 mGycm

Automated exposure control for dose reduction was used.

 

CT CALCIUM SCORING

Coronary calcium is a marker for plaque (fatty deposits) in a blood vessel or atherosclerosis (harden
ing of the arteries).  The presence and amount of calcium detected in a coronary artery by the CT sca
n, indicates the presence and amount of atherosclerotic plaque.  These calcium deposits appear years 
before the development of heart disease symptoms such as chest pain and shortness of breath.

 

A calcium score is computed for each of the coronary arteries based upon the volume and density of th
e calcium deposits.  This can be referred to as your calcified plaque burden.  It does not correspond
 directly to the percentage of narrowing in the artery but does correlate with the severity of the un
derlying coronary atherosclerosis.

 

PROCEDURE

 

TECHNIQUE - Prospective Gating was used.  Slice thickness: 3mm.

Density threshold (HU): 130, Pixel threshold: 3, Algorithm: discrete.

 

RESULTS

 

Region:  LM

Calcium Score (Agatston): 0

Volume (mm3): 0

Mass (g): 0

 

Region:  RCA

Calcium Score (Agatston): 0

Volume (mm3): 0

Mass (g): 0

 

Region:  LAD

Calcium Score (Agatston): 0

Volume (mm3): 0

Mass (g): 0

 

Region:  CX

Calcium Score (Agatston): 0

Volume (mm3): 0

Mass (g): 0

 

Region:  PDA

Calcium Score (Agatston):  0

Volume (mm3):  0

Mass (g):  0

 

Total:

Calcium Score (Agatston):  0

Volume (mm3): 0

Mass (g):  0

 

TOTAL CALCIUM SCORE:  0

 

IMPRESSION: 

 

Calcium Score:  0

 

Implication:  No identifiable plaque.

 

Risk of Coronary Artery Disease: Very low, generally less than 5%.

 

 

 

CALCIUM SCORE    IMPLICATION                                                                RISK OF C
ORONARY ARTERY DISEASE

0                                    No identifiable plaque                                          
          Very low, generally less than 5%

1-10                              Minimal identifiable plaque                                        
    Very unlikely, less than 10%

                         Definite, at least mild atherosclerotic plaque               Mild or m
inimal coronary narrowings likely

101-400                       Definite, at least moderate atherosclerotic plaque     Mild coronary ar
abbey disease highly likely, significant narrowing possible

401 or Higher              Extensive atherosclerotic plaque                                  High lik
elihood of at least one significant coronary narrowing

r

## 2024-11-05 ENCOUNTER — HOSPITAL ENCOUNTER (OUTPATIENT)
Dept: HOSPITAL 47 - RADMAMWWP | Age: 64
Discharge: HOME | End: 2024-11-05
Attending: INTERNAL MEDICINE
Payer: MEDICAID

## 2024-11-05 DIAGNOSIS — Z12.31: Primary | ICD-10-CM

## 2024-11-05 DIAGNOSIS — Z78.0: ICD-10-CM

## 2024-11-05 DIAGNOSIS — Z80.3: ICD-10-CM

## 2024-11-05 PROCEDURE — 77063 BREAST TOMOSYNTHESIS BI: CPT

## 2024-11-05 PROCEDURE — 77067 SCR MAMMO BI INCL CAD: CPT

## 2025-05-17 ENCOUNTER — HOSPITAL ENCOUNTER (OUTPATIENT)
Dept: HOSPITAL 47 - LABWHC1 | Age: 65
Discharge: HOME | End: 2025-05-17
Attending: INTERNAL MEDICINE
Payer: MEDICAID

## 2025-05-17 DIAGNOSIS — I10: ICD-10-CM

## 2025-05-17 DIAGNOSIS — Z00.00: Primary | ICD-10-CM

## 2025-05-17 DIAGNOSIS — M81.0: ICD-10-CM

## 2025-05-17 DIAGNOSIS — E78.2: ICD-10-CM

## 2025-05-17 LAB
ALBUMIN SERPL-MCNC: 4.2 G/DL (ref 3.8–4.9)
ALP SERPL-CCNC: 39 U/L (ref 41–126)
ALT SERPL-CCNC: 19 U/L (ref 8–44)
AST SERPL-CCNC: 21 U/L (ref 13–35)
BASOPHILS # BLD AUTO: 0.07 X 10*3/UL (ref 0–0.1)
BASOPHILS NFR BLD AUTO: 0.9 %
BUN SERPL-SCNC: 16.6 MG/DL (ref 9–27)
BUN/CREAT SERPL: 23.71 RATIO (ref 12–20)
CALCIUM SPEC-MCNC: 9.8 MG/DL (ref 8.7–10.3)
CHLORIDE SERPL-SCNC: 101 MMOL/L (ref 96–109)
CK SERPL-CCNC: 35 U/L (ref 26–186)
EOSINOPHIL # BLD AUTO: 0.17 X 10*3/UL (ref 0.04–0.35)
EOSINOPHIL NFR BLD AUTO: 2.2 %
ERYTHROCYTE [DISTWIDTH] IN BLOOD BY AUTOMATED COUNT: 5.17 X 10*6/UL (ref 4.1–5.2)
ERYTHROCYTE [DISTWIDTH] IN BLOOD: 13.2 % (ref 11.5–14.5)
GLOBULIN SER CALC-MCNC: 2.1 G/DL (ref 1.6–3.3)
GLUCOSE SERPL-MCNC: 85 MG/DL (ref 70–110)
HCT VFR BLD AUTO: 43.9 % (ref 37.2–46.3)
HGB BLD-MCNC: 14.3 G/DL (ref 12–15)
LDLC SERPL CALC-MCNC: 61.2 MG/DL (ref 0–131)
LYMPHOCYTES NFR SPEC AUTO: 27.6 %
MCH RBC QN AUTO: 27.7 PG (ref 27–32)
MCHC RBC AUTO-ENTMCNC: 32.6 G/DL (ref 32–37)
MCV RBC AUTO: 84.9 FL (ref 80–97)
MONOCYTES # BLD AUTO: 0.97 X 10*3/UL (ref 0.2–1)
MONOCYTES NFR BLD AUTO: 12.7 %
NEUTROPHILS # BLD AUTO: 4.29 X 10*3/UL (ref 1.8–7.7)
NEUTROPHILS NFR BLD AUTO: 56.3 %
NRBC BLD AUTO-RTO: 0 X 10*3/UL (ref 0–0.01)
PLATELET # BLD AUTO: 245 X 10*3/UL (ref 140–440)
POTASSIUM SERPL-SCNC: 4.3 MMOL/L (ref 3.5–5.5)
PROT SERPL-MCNC: 6.3 G/DL (ref 6.2–8.2)
SODIUM SERPL-SCNC: 135 MMOL/L (ref 135–145)
URATE SERPL-MCNC: 4.5 MG/DL (ref 2.9–7.7)
WBC # BLD AUTO: 7.62 X 10*3/UL (ref 4.5–10)

## 2025-05-17 PROCEDURE — 82306 VITAMIN D 25 HYDROXY: CPT

## 2025-05-17 PROCEDURE — 80053 COMPREHEN METABOLIC PANEL: CPT

## 2025-05-17 PROCEDURE — 82550 ASSAY OF CK (CPK): CPT

## 2025-05-17 PROCEDURE — 84550 ASSAY OF BLOOD/URIC ACID: CPT

## 2025-05-17 PROCEDURE — 80061 LIPID PANEL: CPT

## 2025-05-17 PROCEDURE — 36415 COLL VENOUS BLD VENIPUNCTURE: CPT

## 2025-05-17 PROCEDURE — 83735 ASSAY OF MAGNESIUM: CPT

## 2025-05-17 PROCEDURE — 85025 COMPLETE CBC W/AUTO DIFF WBC: CPT

## 2025-05-17 PROCEDURE — 83036 HEMOGLOBIN GLYCOSYLATED A1C: CPT

## 2025-05-17 PROCEDURE — 84443 ASSAY THYROID STIM HORMONE: CPT
